# Patient Record
Sex: FEMALE | Race: WHITE | NOT HISPANIC OR LATINO | Employment: OTHER | URBAN - METROPOLITAN AREA
[De-identification: names, ages, dates, MRNs, and addresses within clinical notes are randomized per-mention and may not be internally consistent; named-entity substitution may affect disease eponyms.]

---

## 2023-06-26 ENCOUNTER — OFFICE VISIT (OUTPATIENT)
Dept: PRIMARY CARE | Facility: CLINIC | Age: 78
End: 2023-06-26
Payer: MEDICARE

## 2023-06-26 DIAGNOSIS — N94.10 DYSPAREUNIA IN FEMALE: Primary | ICD-10-CM

## 2023-06-26 PROCEDURE — UHSMG PR UH SELECT MEET AND GREET: Performed by: INTERNAL MEDICINE

## 2023-06-26 NOTE — PROGRESS NOTES
Patient presents today for meet and greet visit.  She is extraordinarily healthy.  She has mild hyperlipidemia and takes low-dose Crestor  Increased anxiety and does take Lexapro routinely feels it is working pretty well  She just had mammogram performed  She had fasting blood work performed which was in [place] in March  Really hospitalized for childbirth x2  Family history significant for  Mother  at age 50 but was from reaction from bipolar meds  Father had congestive heart failure but not into his 80s  No heart disease at young ages no breast ovarian or colon cancer  She has 1 sister who is healthy  Social history originally from [facility] moved to [place] for her 's job as an   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  Her diet is very healthy  She exercises routinely  She is not a smoker  She does drink alcohol socially and drinks more when she is in [place]    She is currently having some issues with painful intercourse

## 2023-08-07 DIAGNOSIS — Z00.00 ROUTINE HEALTH MAINTENANCE: ICD-10-CM

## 2023-08-08 ENCOUNTER — LAB (OUTPATIENT)
Dept: LAB | Facility: LAB | Age: 78
End: 2023-08-08
Payer: MEDICARE

## 2023-08-08 DIAGNOSIS — Z00.00 ROUTINE HEALTH MAINTENANCE: ICD-10-CM

## 2023-08-08 LAB
ALANINE AMINOTRANSFERASE (SGPT) (U/L) IN SER/PLAS: 12 U/L (ref 7–45)
ALBUMIN (G/DL) IN SER/PLAS: 4 G/DL (ref 3.4–5)
ALKALINE PHOSPHATASE (U/L) IN SER/PLAS: 58 U/L (ref 33–136)
ANION GAP IN SER/PLAS: 10 MMOL/L (ref 10–20)
ASPARTATE AMINOTRANSFERASE (SGOT) (U/L) IN SER/PLAS: 17 U/L (ref 9–39)
BASOPHILS (10*3/UL) IN BLOOD BY AUTOMATED COUNT: 0.03 X10E9/L (ref 0–0.1)
BASOPHILS/100 LEUKOCYTES IN BLOOD BY AUTOMATED COUNT: 0.7 % (ref 0–2)
BILIRUBIN TOTAL (MG/DL) IN SER/PLAS: 0.5 MG/DL (ref 0–1.2)
C REACTIVE PROTEIN (MG/L) IN SER/PLAS BY HIGH SENSIT: 0.3 MG/L
CALCIDIOL (25 OH VITAMIN D3) (NG/ML) IN SER/PLAS: 48 NG/ML
CALCIUM (MG/DL) IN SER/PLAS: 9.7 MG/DL (ref 8.6–10.3)
CARBON DIOXIDE, TOTAL (MMOL/L) IN SER/PLAS: 27 MMOL/L (ref 21–32)
CHLORIDE (MMOL/L) IN SER/PLAS: 107 MMOL/L (ref 98–107)
CHOLESTEROL (MG/DL) IN SER/PLAS: 211 MG/DL (ref 0–199)
CHOLESTEROL IN HDL (MG/DL) IN SER/PLAS: 75.2 MG/DL
CHOLESTEROL/HDL RATIO: 2.8
CREATININE (MG/DL) IN SER/PLAS: 0.85 MG/DL (ref 0.5–1.05)
EOSINOPHILS (10*3/UL) IN BLOOD BY AUTOMATED COUNT: 0.09 X10E9/L (ref 0–0.4)
EOSINOPHILS/100 LEUKOCYTES IN BLOOD BY AUTOMATED COUNT: 2.2 % (ref 0–6)
ERYTHROCYTE DISTRIBUTION WIDTH (RATIO) BY AUTOMATED COUNT: 12.1 % (ref 11.5–14.5)
ERYTHROCYTE MEAN CORPUSCULAR HEMOGLOBIN CONCENTRATION (G/DL) BY AUTOMATED: 34 G/DL (ref 32–36)
ERYTHROCYTE MEAN CORPUSCULAR VOLUME (FL) BY AUTOMATED COUNT: 89 FL (ref 80–100)
ERYTHROCYTES (10*6/UL) IN BLOOD BY AUTOMATED COUNT: 4.34 X10E12/L (ref 4–5.2)
ESTIMATED AVERAGE GLUCOSE FOR HBA1C: 105 MG/DL
GFR FEMALE: 71 ML/MIN/1.73M2
GLUCOSE (MG/DL) IN SER/PLAS: 95 MG/DL (ref 74–99)
HEMATOCRIT (%) IN BLOOD BY AUTOMATED COUNT: 38.8 % (ref 36–46)
HEMOGLOBIN (G/DL) IN BLOOD: 13.2 G/DL (ref 12–16)
HEMOGLOBIN A1C/HEMOGLOBIN TOTAL IN BLOOD: 5.3 %
IMMATURE GRANULOCYTES/100 LEUKOCYTES IN BLOOD BY AUTOMATED COUNT: 0.2 % (ref 0–0.9)
LDL: 124 MG/DL (ref 0–99)
LEUKOCYTES (10*3/UL) IN BLOOD BY AUTOMATED COUNT: 4 X10E9/L (ref 4.4–11.3)
LYMPHOCYTES (10*3/UL) IN BLOOD BY AUTOMATED COUNT: 1.46 X10E9/L (ref 0.8–3)
LYMPHOCYTES/100 LEUKOCYTES IN BLOOD BY AUTOMATED COUNT: 36.2 % (ref 13–44)
MONOCYTES (10*3/UL) IN BLOOD BY AUTOMATED COUNT: 0.37 X10E9/L (ref 0.05–0.8)
MONOCYTES/100 LEUKOCYTES IN BLOOD BY AUTOMATED COUNT: 9.2 % (ref 2–10)
MUCUS, URINE: NORMAL /LPF
NEUTROPHILS (10*3/UL) IN BLOOD BY AUTOMATED COUNT: 2.07 X10E9/L (ref 1.6–5.5)
NEUTROPHILS/100 LEUKOCYTES IN BLOOD BY AUTOMATED COUNT: 51.5 % (ref 40–80)
PLATELETS (10*3/UL) IN BLOOD AUTOMATED COUNT: 185 X10E9/L (ref 150–450)
POTASSIUM (MMOL/L) IN SER/PLAS: 4.2 MMOL/L (ref 3.5–5.3)
PROTEIN TOTAL: 6.2 G/DL (ref 6.4–8.2)
RBC, URINE: 1 /HPF (ref 0–5)
SODIUM (MMOL/L) IN SER/PLAS: 140 MMOL/L (ref 136–145)
SQUAMOUS EPITHELIAL CELLS, URINE: <1 /HPF
THYROTROPIN (MIU/L) IN SER/PLAS BY DETECTION LIMIT <= 0.05 MIU/L: 3.52 MIU/L (ref 0.44–3.98)
TRIGLYCERIDE (MG/DL) IN SER/PLAS: 60 MG/DL (ref 0–149)
UREA NITROGEN (MG/DL) IN SER/PLAS: 27 MG/DL (ref 6–23)
VLDL: 12 MG/DL (ref 0–40)
WBC, URINE: 3 /HPF (ref 0–5)

## 2023-08-08 PROCEDURE — 84443 ASSAY THYROID STIM HORMONE: CPT

## 2023-08-08 PROCEDURE — 83036 HEMOGLOBIN GLYCOSYLATED A1C: CPT

## 2023-08-08 PROCEDURE — 80053 COMPREHEN METABOLIC PANEL: CPT

## 2023-08-08 PROCEDURE — 80061 LIPID PANEL: CPT

## 2023-08-08 PROCEDURE — 36415 COLL VENOUS BLD VENIPUNCTURE: CPT

## 2023-08-08 PROCEDURE — 85025 COMPLETE CBC W/AUTO DIFF WBC: CPT

## 2023-08-08 PROCEDURE — 82306 VITAMIN D 25 HYDROXY: CPT

## 2023-08-08 PROCEDURE — 86141 C-REACTIVE PROTEIN HS: CPT

## 2023-08-08 PROCEDURE — 81001 URINALYSIS AUTO W/SCOPE: CPT

## 2023-08-09 NOTE — PROGRESS NOTES
Physical Exam    Name Bisi Doran    Date of Service :8/10/2023      Bisi Doran  78 y.o. is here for an annual physical exam  She is extraordinarily healthy with past medical history significant for  Mild hyperlipidemia and takes low-dose Crestor did have coronary artery calcium score in 2014 of 45  Anxiety takes Lexapro routinely which has worked well rare alprazolam  Osteopenia with T-score right around -2 last bone densitometry just performed July 2023 T-score -2.1 really very stable compared to previous study  Colonic polyps last colonoscopy was 2018 and did show 2 polyps which were tubular adenomas she is followed with Dr. Barron  Hospitalized for childbirth x 2  She has received both Prevnar 13 and Pneumovax 23 in the past    Past Medical History:   Diagnosis Date    Encounter for examination of ears and hearing without abnormal findings     Encounter for hearing test    Personal history of other endocrine, nutritional and metabolic disease 07/28/2015    History of hypercholesterolemia    Personal history of other mental and behavioral disorders     History of depression       Past Surgical History:   Procedure Laterality Date    APPENDECTOMY  08/20/2014    Appendectomy    TONSILLECTOMY  08/20/2014    Tonsillectomy        Social History     Tobacco Use    Smoking status: Never    Smokeless tobacco: Never        Social History     Social History Narrative    You are originally from [place]    Have lived in [place] for 53 years where you raised her family moved to [place] for your 's job as an     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        2 children both who live out of the state 1 in [place] and 1 in [place] and 3 grandchildren who are 1417 and 18    Diet is healthy    Routine exercise walk play golf do have a     You have never been a smoker    You do occasionally drink alcohol        Increased stress at  "this time as daughter who has multiple health issues and you are primary support system       Family History   Problem Relation Name Age of Onset    Bipolar disorder Mother      Heart failure Father  80    No Known Problems Sister          /78   Ht 1.651 m (5' 5\")   Wt 61.2 kg (135 lb)   BMI 22.47 kg/m²     Physical Exam  Physical examination  Reveals a well-developed woman in no acute distress    appearance is younger than stated age  HEENT exam  Extraocular motion is intact  Tympanic membranes and external auditory canals are normal  Oropharynx is normal  There is no cervical lymphadenopathy appreciated  The thyroid is within normal limits    Lungs    clear to auscultation and percussion    Cardiovascular   regular rate and rhythm  No murmurs rubs or gallops are appreciated    Breast examination   No dominant masses nipple discharge or axillary lymphadenopathy is appreciated    Abdomen   soft nontender bowel sounds are positive   there is no organomegaly noted        Periphery  Pulses are present without deficits noted  No peripheral edema is noted    Musculoskeletal  Gait is normal  Is no joint erythema or swelling noted  Range of motion is within normal limits  Strength is 5 of 5 without deficits noted    Dermatology  No concerning skin lesions are noted    Neurology  No deficits are noted  Judgment appears appropriate  Mood and affect are appropriate         Results from last 7 days   Lab Units 08/08/23  0809   WBC AUTO x10E9/L 4.0*   HEMOGLOBIN g/dL 13.2   HEMATOCRIT % 38.8   PLATELETS AUTO x10E9/L 185   NEUTROS PCT AUTO % 51.5   LYMPHS PCT AUTO % 36.2   MONOS PCT AUTO % 9.2   EOS PCT AUTO % 2.2      Results from last 7 days   Lab Units 08/08/23  0809   HEMOGLOBIN A1C % 5.3     Results from last 7 days   Lab Units 08/08/23  0809   SODIUM mmol/L 140   POTASSIUM mmol/L 4.2   CHLORIDE mmol/L 107   CO2 mmol/L 27   BUN mg/dL 27*   CREATININE mg/dL 0.85   CALCIUM mg/dL 9.7   PROTEIN TOTAL g/dL 6.2* " "  BILIRUBIN TOTAL mg/dL 0.5   ALK PHOS U/L 58   ALT U/L 12   AST U/L 17   GLUCOSE mg/dL 95      Results from last 7 days   Lab Units 08/08/23  0809   CHOLESTEROL mg/dL 211*   TRIGLYCERIDES mg/dL 60   HDL mg/dL 75.2   LDLF mg/dL 124*           Results from last 7 days   Lab Units 08/08/23  0809   TSH mIU/L 3.52           No lab exists for component: \"UAPPEAR\", \"UCOLOR\"  No components found for: \"UA\"  Lab on 08/08/2023   Component Date Value Ref Range Status    WBC 08/08/2023 4.0 (L)  4.4 - 11.3 x10E9/L Final    RBC 08/08/2023 4.34  4.00 - 5.20 x10E12/L Final    Hemoglobin 08/08/2023 13.2  12.0 - 16.0 g/dL Final    Hematocrit 08/08/2023 38.8  36.0 - 46.0 % Final    MCV 08/08/2023 89  80 - 100 fL Final    MCHC 08/08/2023 34.0  32.0 - 36.0 g/dL Final    Platelets 08/08/2023 185  150 - 450 x10E9/L Final    RDW 08/08/2023 12.1  11.5 - 14.5 % Final    Neutrophils % 08/08/2023 51.5  40.0 - 80.0 % Final    Immature Granulocytes %, Automated 08/08/2023 0.2  0.0 - 0.9 % Final     Immature Granulocyte Count (IG) includes promyelocytes,    myelocytes and metamyelocytes but does not include bands.   Percent differential counts (%) should be interpreted in the   context of the absolute cell counts (cells/L).    Lymphocytes % 08/08/2023 36.2  13.0 - 44.0 % Final    Monocytes % 08/08/2023 9.2  2.0 - 10.0 % Final    Eosinophils % 08/08/2023 2.2  0.0 - 6.0 % Final    Basophils % 08/08/2023 0.7  0.0 - 2.0 % Final    Neutrophils Absolute 08/08/2023 2.07  1.60 - 5.50 x10E9/L Final    Lymphocytes Absolute 08/08/2023 1.46  0.80 - 3.00 x10E9/L Final    Monocytes Absolute 08/08/2023 0.37  0.05 - 0.80 x10E9/L Final    Eosinophils Absolute 08/08/2023 0.09  0.00 - 0.40 x10E9/L Final    Basophils Absolute 08/08/2023 0.03  0.00 - 0.10 x10E9/L Final    Glucose 08/08/2023 95  74 - 99 mg/dL Final    Sodium 08/08/2023 140  136 - 145 mmol/L Final    Potassium 08/08/2023 4.2  3.5 - 5.3 mmol/L Final    Chloride 08/08/2023 107  98 - 107 mmol/L Final    " Bicarbonate 08/08/2023 27  21 - 32 mmol/L Final    Anion Gap 08/08/2023 10  10 - 20 mmol/L Final    Urea Nitrogen 08/08/2023 27 (H)  6 - 23 mg/dL Final    Creatinine 08/08/2023 0.85  0.50 - 1.05 mg/dL Final    GFR Female 08/08/2023 71  >90 mL/min/1.73m2 Final     CALCULATIONS OF ESTIMATED GFR ARE PERFORMED   USING THE 2021 CKD-EPI STUDY REFIT EQUATION   WITHOUT THE RACE VARIABLE FOR THE IDMS-TRACEABLE   CREATININE METHODS.    https://jasn.asnjournals.org/content/early/2021/09/22/ASN.7284054230    Calcium 08/08/2023 9.7  8.6 - 10.3 mg/dL Final    Albumin 08/08/2023 4.0  3.4 - 5.0 g/dL Final    Alkaline Phosphatase 08/08/2023 58  33 - 136 U/L Final    Total Protein 08/08/2023 6.2 (L)  6.4 - 8.2 g/dL Final    AST 08/08/2023 17  9 - 39 U/L Final    Total Bilirubin 08/08/2023 0.5  0.0 - 1.2 mg/dL Final    ALT (SGPT) 08/08/2023 12  7 - 45 U/L Final     Patients treated with Sulfasalazine may generate    falsely decreased results for ALT.    CRP, High Sensitivity 08/08/2023 0.3  mg/L Final        hsCRP         INTERPRETATION       mg/L  -------------------------------------------      < 1.0      LOW RELATIVE RISK OF CVD      1.0-3.0    AVERAGE RELATIVE RISK OF CVD      > 3.0      HIGH RELATIVE RISK OF CVD     Source:  MATOS, T. A. et al. CIRCULATION 2003;  107:499-511.    Hemoglobin A1C 08/08/2023 5.3  % Final         Diagnosis of Diabetes-Adults   Non-Diabetic: < or = 5.6%   Increased risk for developing diabetes: 5.7-6.4%   Diagnostic of diabetes: > or = 6.5%  .       Monitoring of Diabetes                Age (y)     Therapeutic Goal (%)   Adults:          >18           <7.0   Pediatrics:    13-18           <7.5                   7-12           <8.0                   0- 6            7.5-8.5   American Diabetes Association. Diabetes Care 33(S1), Jan 2010.    Estimated Average Glucose 08/08/2023 105  MG/DL Final    Cholesterol 08/08/2023 211 (H)  0 - 199 mg/dL Final    .      AGE      DESIRABLE   BORDERLINE HIGH   HIGH      0-19 Y     0 - 169       170 - 199     >/= 200    20-24 Y     0 - 189       190 - 224     >/= 225         >24 Y     0 - 199       200 - 239     >/= 240   **All ranges are based on fasting samples. Specific   therapeutic targets will vary based on patient-specific   cardiac risk.  .   Pediatric guidelines reference:Pediatrics 2011, 128(S5).   Adult guidelines reference: NCEP ATPIII Guidelines,     SUSANA 2001, 258:2486-97  .   Venipuncture immediately after or during the    administration of Metamizole may lead to falsely   low results. Testing should be performed immediately   prior to Metamizole dosing.    HDL 08/08/2023 75.2  mg/dL Final    .      AGE      VERY LOW   LOW     NORMAL    HIGH       0-19 Y       < 35   < 40     40-45     ----    20-24 Y       ----   < 40       >45     ----      >24 Y       ----   < 40     40-60      >60  .    Cholesterol/HDL Ratio 08/08/2023 2.8   Final    REF VALUES  DESIRABLE  < 3.4  HIGH RISK  > 5.0    LDL 08/08/2023 124 (H)  0 - 99 mg/dL Final    .                           NEAR      BORD      AGE      DESIRABLE  OPTIMAL    HIGH     HIGH     VERY HIGH     0-19 Y     0 - 109     ---    110-129   >/= 130     ----    20-24 Y     0 - 119     ---    120-159   >/= 160     ----      >24 Y     0 -  99   100-129  130-159   160-189     >/=190  .    VLDL 08/08/2023 12  0 - 40 mg/dL Final    Triglycerides 08/08/2023 60  0 - 149 mg/dL Final    .      AGE      DESIRABLE   BORDERLINE HIGH   HIGH     VERY HIGH   0 D-90 D    19 - 174         ----         ----        ----  91 D- 9 Y     0 -  74        75 -  99     >/= 100      ----    10-19 Y     0 -  89        90 - 129     >/= 130      ----    20-24 Y     0 - 114       115 - 149     >/= 150      ----         >24 Y     0 - 149       150 - 199    200- 499    >/= 500  .   Venipuncture immediately after or during the    administration of Metamizole may lead to falsely   low results. Testing should be performed immediately   prior to Metamizole dosing.     TSH 08/08/2023 3.52  0.44 - 3.98 mIU/L Final     TSH testing is performed using different testing    methodology at [facility] than at other    [facility] hospitals. Direct result comparisons should    only be made within the same method.    WBC, Urine 08/08/2023 3  0 - 5 /HPF Final    RBC, Urine 08/08/2023 1  0 - 5 /HPF Final    Squamous Epithelial Cells, Urine 08/08/2023 <1  /HPF Final    Mucus, Urine 08/08/2023 1+  /LPF Final    Vitamin D, 25-Hydroxy 08/08/2023 48  ng/mL Final    .  DEFICIENCY:         < 20   NG/ML  INSUFFICIENCY:      20-29  NG/ML  SUFFICIENCY:         NG/ML    THIS ASSAY ACCURATELY QUANTIFIES THE SUM OF  VITAMIN D3, 25-HYDROXY AND VIT D2,25-HYDROXY.       ECG: normal sinus rhythm, no blocks or conduction defects, no ischemic changes.   Nonspecific T wave noted    Problem List Items Addressed This Visit       Anxiety    Atherosclerotic heart disease    Hyperlipidemia    Osteopenia    Dyspareunia in female - Primary     Other Visit Diagnoses       Routine health maintenance        Relevant Orders    ECG 12 lead            Assessment/Plan   #1 hypercholesteremia cholesterol is slightly elevated and slightly higher than previous but he is currently taking low-dose Crestor at 5 mg 5 days weekly we are going to increase this to daily with a goal of getting LDL less than 100 plan to recheck cholesterol prior to returning to [place]  2.  Anxiety uses Lexapro routinely very rare alprazolam in fact use probably 1 or 2 in the past year we will refill medications as indicated do not feel controlled substance contract is indicated as she was this so rarely  3.  History of colonic polyp she does have colonoscopy scheduled in August  4.  Osteopenia which has been very stable over time continue adequate calcium vitamin D and weightbearing exercise and recheck bone density in 2 years  5.  Dyspareunia she does have upcoming appointment with specialist has been very bothersome over about the  last 2 years vaginal estrogen has not been helpful  6.  Health maintenance  Congratulated her on a very healthy lifestyle  Up-to-date with mammogram will be repeating colonoscopy soon  Basically up-to-date with immunizations I have recommended COVID-19 booster when she gets flu vaccine and will check on RSV vaccination as well

## 2023-08-10 ENCOUNTER — OFFICE VISIT (OUTPATIENT)
Dept: PRIMARY CARE | Facility: CLINIC | Age: 78
End: 2023-08-10
Payer: MEDICARE

## 2023-08-10 VITALS
WEIGHT: 135 LBS | DIASTOLIC BLOOD PRESSURE: 78 MMHG | BODY MASS INDEX: 22.49 KG/M2 | HEIGHT: 65 IN | SYSTOLIC BLOOD PRESSURE: 118 MMHG

## 2023-08-10 DIAGNOSIS — M85.80 OSTEOPENIA, UNSPECIFIED LOCATION: ICD-10-CM

## 2023-08-10 DIAGNOSIS — F41.9 ANXIETY: ICD-10-CM

## 2023-08-10 DIAGNOSIS — N94.10 DYSPAREUNIA IN FEMALE: Primary | ICD-10-CM

## 2023-08-10 DIAGNOSIS — I25.10 ATHEROSCLEROSIS OF NATIVE CORONARY ARTERY OF NATIVE HEART WITHOUT ANGINA PECTORIS: ICD-10-CM

## 2023-08-10 DIAGNOSIS — E78.00 PURE HYPERCHOLESTEROLEMIA: ICD-10-CM

## 2023-08-10 DIAGNOSIS — Z00.00 ROUTINE HEALTH MAINTENANCE: ICD-10-CM

## 2023-08-10 PROBLEM — E78.5 HYPERLIPIDEMIA: Status: ACTIVE | Noted: 2020-01-08

## 2023-08-10 PROBLEM — D36.9 ADENOMATOUS POLYPS: Status: ACTIVE | Noted: 2023-08-10

## 2023-08-10 PROCEDURE — 1036F TOBACCO NON-USER: CPT | Performed by: INTERNAL MEDICINE

## 2023-08-10 PROCEDURE — 1126F AMNT PAIN NOTED NONE PRSNT: CPT | Performed by: INTERNAL MEDICINE

## 2023-08-10 PROCEDURE — 93000 ELECTROCARDIOGRAM COMPLETE: CPT | Performed by: INTERNAL MEDICINE

## 2023-08-10 PROCEDURE — 1160F RVW MEDS BY RX/DR IN RCRD: CPT | Performed by: INTERNAL MEDICINE

## 2023-08-10 PROCEDURE — UHSPHYS PR UH SELECT PHYSICAL: Performed by: INTERNAL MEDICINE

## 2023-08-10 PROCEDURE — 1159F MED LIST DOCD IN RCRD: CPT | Performed by: INTERNAL MEDICINE

## 2023-08-10 RX ORDER — ALPRAZOLAM 0.25 MG/1
1 TABLET ORAL DAILY PRN
COMMUNITY
Start: 2022-03-11 | End: 2024-01-17 | Stop reason: SDUPTHER

## 2023-08-10 RX ORDER — ACETAMINOPHEN 500 MG
2000 TABLET ORAL
COMMUNITY

## 2023-08-10 RX ORDER — ROSUVASTATIN CALCIUM 5 MG/1
5 TABLET, COATED ORAL NIGHTLY
COMMUNITY
End: 2024-03-17 | Stop reason: SDUPTHER

## 2023-08-10 RX ORDER — ESCITALOPRAM OXALATE 10 MG/1
10 TABLET ORAL DAILY
COMMUNITY
End: 2023-09-05 | Stop reason: SDUPTHER

## 2023-08-10 ASSESSMENT — PAIN SCALES - GENERAL: PAINLEVEL: 0-NO PAIN

## 2023-08-10 NOTE — PATIENT INSTRUCTIONS
It was good to see you.  You are doing a good job with your overall health care.   Cholesterol is slightly elevated and slightly higher than previous values I would like you to start taking the Crestor every day.  You are up-to-date with immunizations except I do recommend a Covid 19 booster vaccination this fall when you get your flu vaccine on need for RSV vaccine as well  Continued routine regular exercise is recommended. American Cardiology Association recommended 100-120 mins weekly of aerobic exercise (exercise that increases your heart rate). In addition you should add resistance training (lifting weights/etc.).   Stretching and flexibility training and is also encouraged  Follow-up with colonoscopy as scheduled  Your bone density study remains stable osteopenia .  Adequate calcium vitamin D and weightbearing exercise is encouraged we will plan to recheck this in 2 years  Your mammogram was normal            I encourage you to stay active and healthy, and to follow these healthy habits:     Try to increase your intake of fish such as salmon and tuna and try to get 2 to 3 servings of fish per week.  Increase your intake of plant-based protein listed here:    Unprocessed nuts, walnuts, or almonds, Nuts and Seeds.      Green vegetables such as Broccoli, Peas, Greens, Spinach    Beans, Chickpeas, & Lentils    Other sources include Potatoes, Quinoa, Seaweed, Soymilk, Tempeh, and Tofu.  Increase food s higher in flavonoids found in black tea, green tea, apples, nuts, citrus fruit, berries, and dark chocolate.  You should avoid fried foods, and sugary or starchy foods and sugary drinks, and void saturated fats.    Try not to dine at restaurants frequently  and avoid fast food restaurants.      Try to get restful sleep approximately 7-9 hours every night.  Work towards getting 30 minutes of  moderate intensity exercise 4 to 5 days per week.  You should also try to exercise at least one hour per day with light  walking.

## 2023-08-21 ENCOUNTER — APPOINTMENT (OUTPATIENT)
Dept: PRIMARY CARE | Facility: CLINIC | Age: 78
End: 2023-08-21
Payer: MEDICARE

## 2023-08-28 DIAGNOSIS — N34.2 INFECTIVE URETHRITIS: Primary | ICD-10-CM

## 2023-08-28 RX ORDER — CIPROFLOXACIN 500 MG/1
500 TABLET ORAL 2 TIMES DAILY
Qty: 10 TABLET | Refills: 0 | Status: SHIPPED | OUTPATIENT
Start: 2023-08-28 | End: 2023-09-02

## 2023-08-30 ENCOUNTER — HOSPITAL ENCOUNTER (OUTPATIENT)
Dept: DATA CONVERSION | Facility: HOSPITAL | Age: 78
End: 2023-08-30
Attending: INTERNAL MEDICINE | Admitting: INTERNAL MEDICINE
Payer: MEDICARE

## 2023-08-30 DIAGNOSIS — Z86.010 PERSONAL HISTORY OF COLONIC POLYPS: ICD-10-CM

## 2023-08-30 DIAGNOSIS — Z12.11 ENCOUNTER FOR SCREENING FOR MALIGNANT NEOPLASM OF COLON: ICD-10-CM

## 2023-08-30 DIAGNOSIS — D36.9 BENIGN NEOPLASM, UNSPECIFIED SITE: ICD-10-CM

## 2023-09-05 DIAGNOSIS — F41.9 ANXIETY: Primary | ICD-10-CM

## 2023-09-05 RX ORDER — ESCITALOPRAM OXALATE 10 MG/1
20 TABLET ORAL DAILY
Qty: 180 TABLET | Refills: 3 | Status: SHIPPED | OUTPATIENT
Start: 2023-09-05

## 2023-10-09 ENCOUNTER — LAB (OUTPATIENT)
Dept: LAB | Facility: LAB | Age: 78
End: 2023-10-09
Payer: MEDICARE

## 2023-10-09 DIAGNOSIS — E78.00 PURE HYPERCHOLESTEROLEMIA: ICD-10-CM

## 2023-10-09 LAB
CHOLEST SERPL-MCNC: 195 MG/DL (ref 0–199)
CHOLESTEROL/HDL RATIO: 2.6
HDLC SERPL-MCNC: 75.9 MG/DL
LDLC SERPL CALC-MCNC: 108 MG/DL (ref 140–190)
NON HDL CHOLESTEROL: 119 MG/DL (ref 0–149)
TRIGL SERPL-MCNC: 56 MG/DL (ref 0–149)
VLDL: 11 MG/DL (ref 0–40)

## 2023-10-09 PROCEDURE — 36415 COLL VENOUS BLD VENIPUNCTURE: CPT

## 2023-10-09 PROCEDURE — 80061 LIPID PANEL: CPT

## 2024-01-17 DIAGNOSIS — F41.9 ANXIETY: Primary | ICD-10-CM

## 2024-01-17 RX ORDER — ALPRAZOLAM 0.25 MG/1
0.25 TABLET ORAL DAILY PRN
Qty: 7 TABLET | Refills: 0 | Status: SHIPPED | OUTPATIENT
Start: 2024-01-17

## 2024-03-05 ENCOUNTER — TELEPHONE (OUTPATIENT)
Dept: PRIMARY CARE | Facility: CLINIC | Age: 79
End: 2024-03-05
Payer: MEDICARE

## 2024-03-05 DIAGNOSIS — Z12.31 SCREENING MAMMOGRAM FOR BREAST CANCER: Primary | ICD-10-CM

## 2024-03-17 DIAGNOSIS — E78.00 PURE HYPERCHOLESTEROLEMIA: Primary | ICD-10-CM

## 2024-03-17 RX ORDER — ROSUVASTATIN CALCIUM 5 MG/1
5 TABLET, COATED ORAL NIGHTLY
Qty: 90 TABLET | Refills: 0 | Status: SHIPPED | OUTPATIENT
Start: 2024-03-17

## 2024-06-10 ENCOUNTER — HOSPITAL ENCOUNTER (OUTPATIENT)
Dept: RADIOLOGY | Facility: CLINIC | Age: 79
Discharge: HOME | End: 2024-06-10
Payer: MEDICARE

## 2024-06-10 VITALS — BODY MASS INDEX: 22.49 KG/M2 | WEIGHT: 135 LBS | HEIGHT: 65 IN

## 2024-06-10 DIAGNOSIS — Z12.31 SCREENING MAMMOGRAM FOR BREAST CANCER: ICD-10-CM

## 2024-06-10 PROCEDURE — 77063 BREAST TOMOSYNTHESIS BI: CPT | Performed by: STUDENT IN AN ORGANIZED HEALTH CARE EDUCATION/TRAINING PROGRAM

## 2024-06-10 PROCEDURE — 77063 BREAST TOMOSYNTHESIS BI: CPT

## 2024-06-10 PROCEDURE — 77067 SCR MAMMO BI INCL CAD: CPT | Performed by: STUDENT IN AN ORGANIZED HEALTH CARE EDUCATION/TRAINING PROGRAM

## 2024-06-27 DIAGNOSIS — Z00.00 PHYSICAL EXAM: ICD-10-CM

## 2024-07-08 ENCOUNTER — LAB (OUTPATIENT)
Dept: LAB | Facility: LAB | Age: 79
End: 2024-07-08
Payer: MEDICARE

## 2024-07-08 DIAGNOSIS — Z00.00 PHYSICAL EXAM: ICD-10-CM

## 2024-07-08 LAB
25(OH)D3 SERPL-MCNC: 44 NG/ML (ref 30–100)
ALBUMIN SERPL BCP-MCNC: 3.9 G/DL (ref 3.4–5)
ALP SERPL-CCNC: 64 U/L (ref 33–136)
ALT SERPL W P-5'-P-CCNC: 12 U/L (ref 7–45)
ANION GAP SERPL CALC-SCNC: 9 MMOL/L (ref 10–20)
APPEARANCE UR: CLEAR
AST SERPL W P-5'-P-CCNC: 19 U/L (ref 9–39)
BASOPHILS # BLD AUTO: 0.02 X10*3/UL (ref 0–0.1)
BASOPHILS NFR BLD AUTO: 0.5 %
BILIRUB SERPL-MCNC: 0.5 MG/DL (ref 0–1.2)
BILIRUB UR STRIP.AUTO-MCNC: NEGATIVE MG/DL
BUN SERPL-MCNC: 30 MG/DL (ref 6–23)
CALCIUM SERPL-MCNC: 9.3 MG/DL (ref 8.6–10.3)
CHLORIDE SERPL-SCNC: 106 MMOL/L (ref 98–107)
CHOLEST SERPL-MCNC: 196 MG/DL (ref 0–199)
CHOLESTEROL/HDL RATIO: 2.5
CO2 SERPL-SCNC: 30 MMOL/L (ref 21–32)
COLOR UR: ABNORMAL
CREAT SERPL-MCNC: 0.93 MG/DL (ref 0.5–1.05)
CRP SERPL-MCNC: <0.1 MG/DL
EGFRCR SERPLBLD CKD-EPI 2021: 64 ML/MIN/1.73M*2
EOSINOPHIL # BLD AUTO: 0.08 X10*3/UL (ref 0–0.4)
EOSINOPHIL NFR BLD AUTO: 2.2 %
ERYTHROCYTE [DISTWIDTH] IN BLOOD BY AUTOMATED COUNT: 12.2 % (ref 11.5–14.5)
EST. AVERAGE GLUCOSE BLD GHB EST-MCNC: 105 MG/DL
GLUCOSE SERPL-MCNC: 81 MG/DL (ref 74–99)
GLUCOSE UR STRIP.AUTO-MCNC: NORMAL MG/DL
HBA1C MFR BLD: 5.3 %
HCT VFR BLD AUTO: 38.4 % (ref 36–46)
HDLC SERPL-MCNC: 79.9 MG/DL
HGB BLD-MCNC: 13.1 G/DL (ref 12–16)
IMM GRANULOCYTES # BLD AUTO: 0.02 X10*3/UL (ref 0–0.5)
IMM GRANULOCYTES NFR BLD AUTO: 0.5 % (ref 0–0.9)
KETONES UR STRIP.AUTO-MCNC: NEGATIVE MG/DL
LDLC SERPL CALC-MCNC: 104 MG/DL
LEUKOCYTE ESTERASE UR QL STRIP.AUTO: ABNORMAL
LYMPHOCYTES # BLD AUTO: 1.45 X10*3/UL (ref 0.8–3)
LYMPHOCYTES NFR BLD AUTO: 39.3 %
MCH RBC QN AUTO: 30.8 PG (ref 26–34)
MCHC RBC AUTO-ENTMCNC: 34.1 G/DL (ref 32–36)
MCV RBC AUTO: 90 FL (ref 80–100)
MONOCYTES # BLD AUTO: 0.3 X10*3/UL (ref 0.05–0.8)
MONOCYTES NFR BLD AUTO: 8.1 %
MUCOUS THREADS #/AREA URNS AUTO: ABNORMAL /LPF
NEUTROPHILS # BLD AUTO: 1.82 X10*3/UL (ref 1.6–5.5)
NEUTROPHILS NFR BLD AUTO: 49.4 %
NITRITE UR QL STRIP.AUTO: NEGATIVE
NON HDL CHOLESTEROL: 116 MG/DL (ref 0–149)
NRBC BLD-RTO: 0 /100 WBCS (ref 0–0)
PH UR STRIP.AUTO: 6 [PH]
PLATELET # BLD AUTO: 178 X10*3/UL (ref 150–450)
POTASSIUM SERPL-SCNC: 4.2 MMOL/L (ref 3.5–5.3)
PROT SERPL-MCNC: 6.1 G/DL (ref 6.4–8.2)
PROT UR STRIP.AUTO-MCNC: NEGATIVE MG/DL
RBC # BLD AUTO: 4.26 X10*6/UL (ref 4–5.2)
RBC # UR STRIP.AUTO: ABNORMAL /UL
RBC #/AREA URNS AUTO: ABNORMAL /HPF
SODIUM SERPL-SCNC: 141 MMOL/L (ref 136–145)
SP GR UR STRIP.AUTO: 1.02
SQUAMOUS #/AREA URNS AUTO: ABNORMAL /HPF
TRIGL SERPL-MCNC: 62 MG/DL (ref 0–149)
TSH SERPL-ACNC: 3.69 MIU/L (ref 0.44–3.98)
UROBILINOGEN UR STRIP.AUTO-MCNC: NORMAL MG/DL
VLDL: 12 MG/DL (ref 0–40)
WBC # BLD AUTO: 3.7 X10*3/UL (ref 4.4–11.3)
WBC #/AREA URNS AUTO: ABNORMAL /HPF

## 2024-07-08 PROCEDURE — 80053 COMPREHEN METABOLIC PANEL: CPT

## 2024-07-08 PROCEDURE — 84443 ASSAY THYROID STIM HORMONE: CPT

## 2024-07-08 PROCEDURE — 82306 VITAMIN D 25 HYDROXY: CPT

## 2024-07-08 PROCEDURE — 81001 URINALYSIS AUTO W/SCOPE: CPT

## 2024-07-08 PROCEDURE — 80061 LIPID PANEL: CPT

## 2024-07-08 PROCEDURE — 83036 HEMOGLOBIN GLYCOSYLATED A1C: CPT

## 2024-07-08 PROCEDURE — 86140 C-REACTIVE PROTEIN: CPT

## 2024-07-08 PROCEDURE — 85025 COMPLETE CBC W/AUTO DIFF WBC: CPT

## 2024-07-08 PROCEDURE — 36415 COLL VENOUS BLD VENIPUNCTURE: CPT

## 2024-07-12 NOTE — PROGRESS NOTES
Physical Exam    Name Bisi Doran    Date of Service :7/15/2024      Bisi Doran  78 y.o. is here for an annual physical exam  She is extraordinarily healthy with past medical history significant for  Mild hyperlipidemia and takes low-dose Crestor did have coronary artery calcium score in 2014 of 45  Anxiety takes Lexapro routinely which has worked well rare alprazolam    Her anxiety is more situational with her daughter who has multiple mental health issues and some physical issues as well she did get a prescription for alprazolam in December and has only used 1 we did discuss alprazolam that is more for very acute situation she feels she might do better with something a little longer lasting  Osteopenia with T-score right around -2 last bone densitometry just performed July 2023 T-score -2.1 really very stable compared to previous study  Colonic polyps last colonoscopy 2023 clean so does not require further colonoscopy  Hospitalized for childbirth x 2  She has received both Prevnar 13 and Pneumovax 23 in the past  Just completed mammogram in June    Overall she is feeling well her biggest concern is her increased stress and anxiety    Past Medical History:   Diagnosis Date    Encounter for examination of ears and hearing without abnormal findings     Encounter for hearing test    Personal history of other endocrine, nutritional and metabolic disease 07/28/2015    History of hypercholesterolemia    Personal history of other mental and behavioral disorders     History of depression       Past Surgical History:   Procedure Laterality Date    APPENDECTOMY  08/20/2014    Appendectomy    TONSILLECTOMY  08/20/2014    Tonsillectomy        Social History     Tobacco Use    Smoking status: Never    Smokeless tobacco: Never   Vaping Use    Vaping status: Never Used        Social History     Social History Narrative    You are originally from [place]    Have lived in [place] for 53 years where you raised her family  "moved to [place] for your 's job as an     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      55 years     2 children both who live out of the state 1 in [place] and 1 in [place] and 3 grandchildren who are 15 178and 18    Diet is healthy    Routine exercise walk play golf do have a     You have never been a smoker    You do occasionally drink alcohol        Increased stress at this time as daughter who has multiple health issues and you are primary support system       Family History   Problem Relation Name Age of Onset    Bipolar disorder Mother           50 allergic reaction    Heart failure Father  80         87    No Known Problems Sister          There were no vitals taken for this visit.    Physical Exam  Physical examination  Reveals a well-developed woman in no acute distress    appearance is younger than stated age  HEENT exam  Extraocular motion is intact  Tympanic membranes and external auditory canals are normal  Oropharynx is normal  There is no cervical lymphadenopathy appreciated  The thyroid is within normal limits    Lungs    clear to auscultation and percussion    Cardiovascular   regular rate and rhythm  No murmurs rubs or gallops are appreciated    Breast examination   No dominant masses nipple discharge or axillary lymphadenopathy is appreciated    Abdomen   soft nontender bowel sounds are positive   there is no organomegaly noted      Periphery  Pulses are present without deficits noted  No peripheral edema is noted    Musculoskeletal  Gait is normal  Is no joint erythema or swelling noted  Range of motion is within normal limits  Strength is 5 of 5 without deficits noted    Dermatology  No concerning skin lesions are noted    Neurology  No deficits are noted  Judgment appears appropriate  Mood and affect are appropriate                          No lab exists for component: \"LABALBU\"   " "                            No lab exists for component: \"UAPPEAR\", \"UCOLOR\"    No components found for: \"UA\"  Lab on 07/08/2024   Component Date Value Ref Range Status    Cholesterol 07/08/2024 196  0 - 199 mg/dL Final          Age      Desirable   Borderline High   High     0-19 Y     0 - 169       170 - 199     >/= 200    20-24 Y     0 - 189       190 - 224     >/= 225         >24 Y     0 - 199       200 - 239     >/= 240   **All ranges are based on fasting samples. Specific   therapeutic targets will vary based on patient-specific   cardiac risk.    Pediatric guidelines reference:Pediatrics 2011, 128(S5).Adult guidelines reference: NCEP ATPIII Guidelines,SUSANA 2001, 258:9686-74    Venipuncture immediately after or during the administration of Metamizole may lead to falsely low results. Testing should be performed immediately prior to Metamizole dosing.    HDL-Cholesterol 07/08/2024 79.9  mg/dL Final      Age       Very Low   Low     Normal    High    0-19 Y    < 35      < 40     40-45     ----  20-24 Y    ----     < 40      >45      ----        >24 Y      ----     < 40     40-60      >60      Cholesterol/HDL Ratio 07/08/2024 2.5   Final      Ref Values  Desirable  < 3.4  High Risk  > 5.0    LDL Calculated 07/08/2024 104 (H)  <=99 mg/dL Final                                Near   Borderline      AGE      Desirable  Optimal    High     High     Very High     0-19 Y     0 - 109     ---    110-129   >/= 130     ----    20-24 Y     0 - 119     ---    120-159   >/= 160     ----      >24 Y     0 -  99   100-129  130-159   160-189     >/=190      VLDL 07/08/2024 12  0 - 40 mg/dL Final    Triglycerides 07/08/2024 62  0 - 149 mg/dL Final       Age         Desirable   Borderline High   High     Very High   0 D-90 D    19 - 174         ----         ----        ----  91 D- 9 Y     0 -  74        75 -  99     >/= 100      ----    10-19 Y     0 -  89        90 - 129     >/= 130      ----    20-24 Y     0 - 114       115 - 149   "   >/= 150      ----         >24 Y     0 - 149       150 - 199    200- 499    >/= 500    Venipuncture immediately after or during the administration of Metamizole may lead to falsely low results. Testing should be performed immediately prior to Metamizole dosing.    Non HDL Cholesterol 07/08/2024 116  0 - 149 mg/dL Final          Age       Desirable   Borderline High   High     Very High     0-19 Y     0 - 119       120 - 144     >/= 145    >/= 160    20-24 Y     0 - 149       150 - 189     >/= 190      ----         >24 Y    30 mg/dL above LDL Cholesterol goal      Glucose 07/08/2024 81  74 - 99 mg/dL Final    Sodium 07/08/2024 141  136 - 145 mmol/L Final    Potassium 07/08/2024 4.2  3.5 - 5.3 mmol/L Final    Chloride 07/08/2024 106  98 - 107 mmol/L Final    Bicarbonate 07/08/2024 30  21 - 32 mmol/L Final    Anion Gap 07/08/2024 9 (L)  10 - 20 mmol/L Final    Urea Nitrogen 07/08/2024 30 (H)  6 - 23 mg/dL Final    Creatinine 07/08/2024 0.93  0.50 - 1.05 mg/dL Final    eGFR 07/08/2024 64  >60 mL/min/1.73m*2 Final    Calculations of estimated GFR are performed using the 2021 CKD-EPI Study Refit equation without the race variable for the IDMS-Traceable creatinine methods.  https://jasn.asnjournals.org/content/early/2021/09/22/ASN.1938694636    Calcium 07/08/2024 9.3  8.6 - 10.3 mg/dL Final    Albumin 07/08/2024 3.9  3.4 - 5.0 g/dL Final    Alkaline Phosphatase 07/08/2024 64  33 - 136 U/L Final    Total Protein 07/08/2024 6.1 (L)  6.4 - 8.2 g/dL Final    AST 07/08/2024 19  9 - 39 U/L Final    Bilirubin, Total 07/08/2024 0.5  0.0 - 1.2 mg/dL Final    ALT 07/08/2024 12  7 - 45 U/L Final    Patients treated with Sulfasalazine may generate falsely decreased results for ALT.    Thyroid Stimulating Hormone 07/08/2024 3.69  0.44 - 3.98 mIU/L Final    Vitamin D, 25-Hydroxy, Total 07/08/2024 44  30 - 100 ng/mL Final    C-Reactive Protein 07/08/2024 <0.10  <1.00 mg/dL Final    Hemoglobin A1C 07/08/2024 5.3  see below % Final     Estimated Average Glucose 07/08/2024 105  Not Established mg/dL Final    WBC 07/08/2024 3.7 (L)  4.4 - 11.3 x10*3/uL Final    nRBC 07/08/2024 0.0  0.0 - 0.0 /100 WBCs Final    RBC 07/08/2024 4.26  4.00 - 5.20 x10*6/uL Final    Hemoglobin 07/08/2024 13.1  12.0 - 16.0 g/dL Final    Hematocrit 07/08/2024 38.4  36.0 - 46.0 % Final    MCV 07/08/2024 90  80 - 100 fL Final    MCH 07/08/2024 30.8  26.0 - 34.0 pg Final    MCHC 07/08/2024 34.1  32.0 - 36.0 g/dL Final    RDW 07/08/2024 12.2  11.5 - 14.5 % Final    Platelets 07/08/2024 178  150 - 450 x10*3/uL Final    Neutrophils % 07/08/2024 49.4  40.0 - 80.0 % Final    Immature Granulocytes %, Automated 07/08/2024 0.5  0.0 - 0.9 % Final    Immature Granulocyte Count (IG) includes promyelocytes, myelocytes and metamyelocytes but does not include bands. Percent differential counts (%) should be interpreted in the context of the absolute cell counts (cells/UL).    Lymphocytes % 07/08/2024 39.3  13.0 - 44.0 % Final    Monocytes % 07/08/2024 8.1  2.0 - 10.0 % Final    Eosinophils % 07/08/2024 2.2  0.0 - 6.0 % Final    Basophils % 07/08/2024 0.5  0.0 - 2.0 % Final    Neutrophils Absolute 07/08/2024 1.82  1.60 - 5.50 x10*3/uL Final    Percent differential counts (%) should be interpreted in the context of the absolute cell counts (cells/uL).    Immature Granulocytes Absolute, Au* 07/08/2024 0.02  0.00 - 0.50 x10*3/uL Final    Lymphocytes Absolute 07/08/2024 1.45  0.80 - 3.00 x10*3/uL Final    Monocytes Absolute 07/08/2024 0.30  0.05 - 0.80 x10*3/uL Final    Eosinophils Absolute 07/08/2024 0.08  0.00 - 0.40 x10*3/uL Final    Basophils Absolute 07/08/2024 0.02  0.00 - 0.10 x10*3/uL Final    Color, Urine 07/08/2024 Light-Yellow  Light-Yellow, Yellow, Dark-Yellow Final    Appearance, Urine 07/08/2024 Clear  Clear Final    Specific Gravity, Urine 07/08/2024 1.020  1.005 - 1.035 Final    pH, Urine 07/08/2024 6.0  5.0, 5.5, 6.0, 6.5, 7.0, 7.5, 8.0 Final    Protein, Urine 07/08/2024  NEGATIVE  NEGATIVE, 10 (TRACE), 20 (TRACE) mg/dL Final    Glucose, Urine 07/08/2024 Normal  Normal mg/dL Final    Blood, Urine 07/08/2024 0.06 (1+) (A)  NEGATIVE Final    Ketones, Urine 07/08/2024 NEGATIVE  NEGATIVE mg/dL Final    Bilirubin, Urine 07/08/2024 NEGATIVE  NEGATIVE Final    Urobilinogen, Urine 07/08/2024 Normal  Normal mg/dL Final    Nitrite, Urine 07/08/2024 NEGATIVE  NEGATIVE Final    Leukocyte Esterase, Urine 07/08/2024 250 Amna/µL (A)  NEGATIVE Final    WBC, Urine 07/08/2024 6-10 (A)  1-5, NONE /HPF Final    RBC, Urine 07/08/2024 3-5  NONE, 1-2, 3-5 /HPF Final    Squamous Epithelial Cells, Urine 07/08/2024 1-9 (SPARSE)  Reference range not established. /HPF Final    Mucus, Urine 07/08/2024 FEW  Reference range not established. /LPF Final     [unfilled]   No results found.   The 10-year ASCVD risk score (Jag DK, et al., 2019) is: 14.7%    Values used to calculate the score:      Age: 78 years      Sex: Female      Is Non- : No      Diabetic: No      Tobacco smoker: No      Systolic Blood Pressure: 114 mmHg      Is BP treated: No      HDL Cholesterol: 79.9 mg/dL      Total Cholesterol: 196 mg/dL   .      Problem List Items Addressed This Visit       Anxiety - Primary    Relevant Medications    clonazePAM (KlonoPIN) 0.5 mg tablet       Assessment/Plan   #1  Hypercholesteremia cholesterol is under adequate control good control continue current regimen which is low-dose Crestor  2.  Anxiety mostly situational she is on Lexapro usually only takes the 1 daily as we had discussed increasing to 20 mg daily she rarely uses alprazolam for discussed perhaps a trial of clonazepam if needed but still would really like to limit its use as we discussed this too is addictive but less so than the alprazolam we will try this and she will report back  3.  Health maintenance  Congratulated her on a very healthy lifestyle  Does not require further colonoscopy  Reviewed all blood work which is  acceptable  Continue with yearly mammogram  I do recommend COVID-19 booster RSV and flu vaccine in the fall  Otherwise she is up-to-date with immunization  4.  Osteopenia bone density has been quite stable last bone density July 2023 repeat July 2025 continue adequate calcium vitamin D in diet

## 2024-07-15 ENCOUNTER — APPOINTMENT (OUTPATIENT)
Dept: PRIMARY CARE | Facility: CLINIC | Age: 79
End: 2024-07-15
Payer: MEDICARE

## 2024-07-15 ENCOUNTER — OFFICE VISIT (OUTPATIENT)
Dept: PRIMARY CARE | Facility: CLINIC | Age: 79
End: 2024-07-15
Payer: MEDICARE

## 2024-07-15 VITALS
HEIGHT: 65 IN | BODY MASS INDEX: 22.66 KG/M2 | DIASTOLIC BLOOD PRESSURE: 78 MMHG | WEIGHT: 136 LBS | SYSTOLIC BLOOD PRESSURE: 114 MMHG

## 2024-07-15 DIAGNOSIS — F41.9 ANXIETY: ICD-10-CM

## 2024-07-15 DIAGNOSIS — M85.80 OSTEOPENIA, UNSPECIFIED LOCATION: ICD-10-CM

## 2024-07-15 DIAGNOSIS — F41.9 ANXIETY: Primary | ICD-10-CM

## 2024-07-15 DIAGNOSIS — E78.00 PURE HYPERCHOLESTEROLEMIA: Primary | ICD-10-CM

## 2024-07-15 PROCEDURE — G0439 PPPS, SUBSEQ VISIT: HCPCS | Performed by: INTERNAL MEDICINE

## 2024-07-15 PROCEDURE — 1159F MED LIST DOCD IN RCRD: CPT | Performed by: INTERNAL MEDICINE

## 2024-07-15 PROCEDURE — 1036F TOBACCO NON-USER: CPT | Performed by: INTERNAL MEDICINE

## 2024-07-15 PROCEDURE — UHSPHYS PR UH SELECT PHYSICAL: Performed by: INTERNAL MEDICINE

## 2024-07-15 PROCEDURE — 1160F RVW MEDS BY RX/DR IN RCRD: CPT | Performed by: INTERNAL MEDICINE

## 2024-07-15 RX ORDER — CLONAZEPAM 0.5 MG/1
0.5 TABLET ORAL 2 TIMES DAILY
Qty: 15 TABLET | Refills: 0 | Status: SHIPPED | OUTPATIENT
Start: 2024-07-15

## 2024-07-15 ASSESSMENT — ENCOUNTER SYMPTOMS
OCCASIONAL FEELINGS OF UNSTEADINESS: 0
LOSS OF SENSATION IN FEET: 0
DEPRESSION: 0

## 2024-07-15 ASSESSMENT — PROMIS GLOBAL HEALTH SCALE
RATE_MENTAL_HEALTH: GOOD
RATE_PHYSICAL_HEALTH: EXCELLENT
CARRYOUT_PHYSICAL_ACTIVITIES: COMPLETELY
RATE_GENERAL_HEALTH: VERY GOOD
RATE_AVERAGE_PAIN: 0
RATE_QUALITY_OF_LIFE: VERY GOOD
CARRYOUT_SOCIAL_ACTIVITIES: EXCELLENT
EMOTIONAL_PROBLEMS: OFTEN
RATE_SOCIAL_SATISFACTION: EXCELLENT

## 2024-07-15 ASSESSMENT — ACTIVITIES OF DAILY LIVING (ADL)
DOING_HOUSEWORK: INDEPENDENT
TAKING_MEDICATION: INDEPENDENT
MANAGING_FINANCES: INDEPENDENT
GROCERY_SHOPPING: INDEPENDENT

## 2024-07-15 NOTE — PATIENT INSTRUCTIONS
It was good to see you.  You are doing a good job with your overall health care.   Blood work is all acceptable  Continue routine regular exercise  As you are well aware your biggest issue is anxiety I know it is situational.  Making sure you keep track of yourself is very important so I am glad you are going to counseling  Lets try the clonazepam to use as needed for the anxiety as we discussed it lasts a little longer and is less addictive than alprazolam so think it is worth a trial  I do recommend COVID vaccination RSV and flu vaccination in the fall  Continue with yearly mammograms  You will be due for bone density in about 1 year    I encourage you to stay active and healthy, and to follow these healthy habits:     Try to increase your intake of fish such as salmon and tuna and try to get 2 to 3 servings of fish per week.  Increase your intake of plant-based protein listed here:    Unprocessed nuts, walnuts, or almonds, Nuts and Seeds.      Green vegetables such as Broccoli, Peas, Greens, Spinach    Beans, Chickpeas, & Lentils    Other sources include Potatoes, Quinoa, Seaweed, Soymilk, Tempeh, and Tofu.  Increase food s higher in flavonoids found in black tea, green tea, apples, nuts, citrus fruit, berries, and dark chocolate.  You should avoid fried foods, and sugary or starchy foods and sugary drinks, and avoid saturated fats.    Try not to dine at restaurants frequently and avoid fast food restaurants.      Try to get restful sleep approximately 7-9 hours every night.  Work towards getting 30 minutes of  moderate intensity exercise 4 to 5 days per week.  You should also try to exercise at least one hour per day with light walking.

## 2024-07-15 NOTE — PROGRESS NOTES
Chief Complaint: Medicare Wellness Exam/Comprehensive Problem Focused Follow Up and Physical Exam    HPI:    She is extraordinarily healthy with past medical history significant for  Mild hyperlipidemia and takes low-dose Crestor did have coronary artery calcium score in 2014 of 45  Anxiety takes Lexapro routinely which has worked well rare alprazolam    Her anxiety is more situational with her daughter who has multiple mental health issues and some physical issues as well she did get a prescription for alprazolam in December and has only used 1 we did discuss alprazolam that is more for very acute situation she feels she might do better with something a little longer lasting  Osteopenia with T-score right around -2 last bone densitometry just performed July 2023 T-score -2.1 really very stable compared to previous study  Colonic polyps last colonoscopy 2023 clean so does not require further colonoscopy  Hospitalized for childbirth x 2  She has received both Prevnar 13 and Pneumovax 23 in the past  Just completed mammogram in June     Overall she is feeling well her biggest concern is her increased stress and anxiety  Active Problem List      Comprehensive Medical/Surgical/Social/Family History  Past Medical History:   Diagnosis Date    Encounter for examination of ears and hearing without abnormal findings     Encounter for hearing test    Personal history of other endocrine, nutritional and metabolic disease 07/28/2015    History of hypercholesterolemia    Personal history of other mental and behavioral disorders     History of depression     Past Surgical History:   Procedure Laterality Date    APPENDECTOMY  08/20/2014    Appendectomy    TONSILLECTOMY  08/20/2014    Tonsillectomy     Social History     Social History Narrative    You are originally from [place]    Have lived in [place] for 53 years where you raised her family moved to [place] for your 's job as an     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      55 years     2 children both who live out of the state 1 in [place] and 1 in [place] and 3 grandchildren who are 15 178and 18    Diet is healthy    Routine exercise walk play golf do have a     You have never been a smoker    You do occasionally drink alcohol        Increased stress at this time as daughter who has multiple health issues and you are primary support system         Allergies and Medications  Penicillins  Current Outpatient Medications on File Prior to Visit   Medication Sig Dispense Refill    ALPRAZolam (Xanax) 0.25 mg tablet Take 1 tablet (0.25 mg) by mouth once daily as needed for anxiety. 7 tablet 0    calcium carbonate (CALCIUM 600 ORAL) Take by mouth 2 times a day.      cholecalciferol (Vitamin D-3) 50 mcg (2,000 unit) capsule Take 1 capsule (50 mcg) by mouth once daily.      clonazePAM (KlonoPIN) 0.5 mg tablet Take 1 tablet (0.5 mg) by mouth 2 times a day. 15 tablet 0    escitalopram (Lexapro) 10 mg tablet Take 2 tablets (20 mg) by mouth once daily. 180 tablet 3    rosuvastatin (Crestor) 5 mg tablet Take 1 tablet (5 mg) by mouth once daily at bedtime. 90 tablet 0     No current facility-administered medications on file prior to visit.       Medications and Supplements  prescribed by me and other practitioners or clinical pharmacist (such as prescriptions, OTC's, herbal therapies and supplements) were reviewed and documented in the medical record.    Tobacco/Alcohol/Opioid use, as well as Illicit Drug Use was screened for/reviewed and documented in Social History section and medication list as appropriate  Activities of Daily Living  In your present state of health, do you have any difficulty performing the following activities?:   Preparing food and eating?: No  Bathing yourself: No  Getting dressed: No  Using the toilet:No  Moving around from place to place: No  In the past year have  "you fallen or had a near fall?:No    Depression Screen  (Note: if answer to either of the following is \"Yes\", then a more complete depression screening is indicated)   Q1: Over the past two weeks, have you felt down, depressed or hopeless? No  Q2: Over the past two weeks, have you felt little interest or pleasure in doing things? No    Current exercise habits: Exercises routinely she plays golf frequently and does have a  as well  Dietary issues discussed: Yes  Hearing difficulties: Yes  Safe in current home environment: yes  Visual Acuity assessed: no  Cognitive Impairment assessed: no       Advance directives  Advanced Care Planning (including a Living Will, Healthcare POA, as well as specific end of life choices and/or directives), was discussed   Cardiac Risk Assessment  Cardiovascular risk was discussed and, if needed, lifestyle modifications recommended, including nutritional choices, exercise, and elimination of habits contributing to risk. We agreed on a plan to reduce the current cardiovascular risk based on above discussion as needed.  Aspirin use/disuse was discussed after reviewing the updated guidelines below:    Consider low dose Aspirin ( mg) use if the benefit for cardiovascular disease prevention outweighs risk for bleeding complications.   In general, low dose ASA should be considered:  In patients WITHOUT prior MI/stroke/PAD (primary prevention):   a. Age <60: Use if 10-year cardiovascular disease risk >20%, with discussion of risks and benefits with patient  b. Age 60-<70: Use if 10-year cardiovascular disease risk >20% and low bleeding (e.g., gastrointenstinal) risk, with discussion of risks and benefits with patient  c. Age >=70: Do not use    In patients WITH prior MI/stroke/PAD (secondary prevention):   Generally use unless extremely high bleeding (e.g., gastrointenstinal) risk, with discussion of risks and benefits with patient    ROS otherwise negative aside from what " "was mentioned above in HPI.    Vitals  /78   Ht 1.651 m (5' 5\")   Wt 61.7 kg (136 lb)   BMI 22.63 kg/m²   Body mass index is 22.63 kg/m².  Physical Exam  Gen: Alert, NAD  HEENT:  PERRLA, EOMI, conjunctiva and sclera normal in appearance. External auditory canals/TMs normal; Oral cavity and posterior pharynx without lesions/exudate  Neck:  Supple with FROM; No masses/nodes palpable; Thyroid nontender and without nodules; No PARTH  Respiratory:  Lungs CTAB  Cardiovascular:  Heart RRR. No M/R/G. Peripheral pulses equal bilaterally  Abdomen:  Soft, nontender, BS present throughout; No R/G/R; No HSM or masses palpated  Extremities:  FROM all extremities; Muscle strength grossly normal with good tone  Neuro:  CN II-XII intact; Reflexes 2+/2+; Gross motor and sensory intact  Skin:  No suspicious lesions present  Breast: No masses, skin lesions or nipple discharges, no axillary lymphadenopathy    Assessment and Plan:    #1  Hypercholesteremia cholesterol is under adequate control good control continue current regimen which is low-dose Crestor  2.  Anxiety mostly situational she is on Lexapro usually only takes the 1 daily as we had discussed increasing to 20 mg daily she rarely uses alprazolam for discussed perhaps a trial of clonazepam if needed but still would really like to limit its use as we discussed this too is addictive but less so than the alprazolam we will try this and she will report back  3.  Health maintenance  Congratulated her on a very healthy lifestyle  Does not require further colonoscopy  Reviewed all blood work which is acceptable  Continue with yearly mammogram  I do recommend COVID-19 booster RSV and flu vaccine in the fall  Otherwise she is up-to-date with immunization  4.  Osteopenia bone density has been quite stable last bone density July 2023 repeat July 2025 continue adequate calcium vitamin D in diet         During the course of the visit the patient was educated and counseled about age " appropriate screening and preventive services. Completed preventive screenings were documented in the chart and orders were placed for outstanding screenings/procedures as documented in the Assessment and Plan.      Patient Instructions (the written plan) was given to the patient at check out.      Pricilla Moran MD

## 2024-07-22 ENCOUNTER — APPOINTMENT (OUTPATIENT)
Dept: PRIMARY CARE | Facility: CLINIC | Age: 79
End: 2024-07-22
Payer: MEDICARE

## 2024-10-02 DIAGNOSIS — E78.00 PURE HYPERCHOLESTEROLEMIA: ICD-10-CM

## 2024-10-02 RX ORDER — ROSUVASTATIN CALCIUM 5 MG/1
5 TABLET, COATED ORAL NIGHTLY
Qty: 90 TABLET | Refills: 3 | Status: SHIPPED | OUTPATIENT
Start: 2024-10-02

## 2024-10-02 RX ORDER — ROSUVASTATIN CALCIUM 5 MG/1
5 TABLET, COATED ORAL NIGHTLY
Qty: 90 TABLET | Refills: 3 | Status: SHIPPED | OUTPATIENT
Start: 2024-10-02 | End: 2024-10-02 | Stop reason: SDUPTHER

## 2024-10-21 DIAGNOSIS — F41.9 ANXIETY: ICD-10-CM

## 2024-10-21 RX ORDER — ESCITALOPRAM OXALATE 10 MG/1
20 TABLET ORAL DAILY
Qty: 180 TABLET | Refills: 3 | Status: SHIPPED | OUTPATIENT
Start: 2024-10-21

## 2025-02-07 DIAGNOSIS — Z78.0 ASYMPTOMATIC MENOPAUSAL STATE: ICD-10-CM

## 2025-02-07 DIAGNOSIS — Z78.0 MENOPAUSE: Primary | ICD-10-CM

## 2025-02-07 DIAGNOSIS — Z00.00 ROUTINE HEALTH MAINTENANCE: ICD-10-CM

## 2025-06-16 ENCOUNTER — HOSPITAL ENCOUNTER (OUTPATIENT)
Dept: RADIOLOGY | Facility: CLINIC | Age: 80
Discharge: HOME | End: 2025-06-16
Payer: MEDICARE

## 2025-06-16 DIAGNOSIS — Z12.31 ENCOUNTER FOR SCREENING MAMMOGRAM FOR MALIGNANT NEOPLASM OF BREAST: ICD-10-CM

## 2025-06-16 PROCEDURE — 77067 SCR MAMMO BI INCL CAD: CPT | Performed by: RADIOLOGY

## 2025-06-16 PROCEDURE — 77067 SCR MAMMO BI INCL CAD: CPT

## 2025-06-16 PROCEDURE — 77063 BREAST TOMOSYNTHESIS BI: CPT | Performed by: RADIOLOGY

## 2025-06-23 ENCOUNTER — LAB (OUTPATIENT)
Dept: LAB | Facility: HOSPITAL | Age: 80
End: 2025-06-23
Payer: MEDICARE

## 2025-06-23 DIAGNOSIS — Z00.00 ENCOUNTER FOR GENERAL ADULT MEDICAL EXAMINATION WITHOUT ABNORMAL FINDINGS: Primary | ICD-10-CM

## 2025-06-23 LAB
25(OH)D3 SERPL-MCNC: 54 NG/ML (ref 30–100)
ALBUMIN SERPL BCP-MCNC: 4 G/DL (ref 3.4–5)
ALP SERPL-CCNC: 63 U/L (ref 33–136)
ALT SERPL W P-5'-P-CCNC: 15 U/L (ref 7–45)
ANION GAP SERPL CALC-SCNC: 12 MMOL/L (ref 10–20)
APPEARANCE UR: CLEAR
AST SERPL W P-5'-P-CCNC: 19 U/L (ref 9–39)
BACTERIA #/AREA URNS AUTO: ABNORMAL /HPF
BASOPHILS # BLD AUTO: 0.01 X10*3/UL (ref 0–0.1)
BASOPHILS NFR BLD AUTO: 0.3 %
BILIRUB SERPL-MCNC: 0.6 MG/DL (ref 0–1.2)
BILIRUB UR STRIP.AUTO-MCNC: NEGATIVE MG/DL
BUN SERPL-MCNC: 29 MG/DL (ref 6–23)
CALCIUM SERPL-MCNC: 9.7 MG/DL (ref 8.6–10.3)
CHLORIDE SERPL-SCNC: 107 MMOL/L (ref 98–107)
CHOLEST SERPL-MCNC: 218 MG/DL (ref 0–199)
CHOLESTEROL/HDL RATIO: 2.4
CO2 SERPL-SCNC: 26 MMOL/L (ref 21–32)
COLOR UR: ABNORMAL
CREAT SERPL-MCNC: 1 MG/DL (ref 0.5–1.05)
CRP SERPL HS-MCNC: 0.3 MG/L
EGFRCR SERPLBLD CKD-EPI 2021: 58 ML/MIN/1.73M*2
EOSINOPHIL # BLD AUTO: 0.12 X10*3/UL (ref 0–0.4)
EOSINOPHIL NFR BLD AUTO: 3.1 %
ERYTHROCYTE [DISTWIDTH] IN BLOOD BY AUTOMATED COUNT: 12.5 % (ref 11.5–14.5)
EST. AVERAGE GLUCOSE BLD GHB EST-MCNC: 105 MG/DL
GLUCOSE SERPL-MCNC: 94 MG/DL (ref 74–99)
GLUCOSE UR STRIP.AUTO-MCNC: NORMAL MG/DL
HBA1C MFR BLD: 5.3 % (ref ?–5.7)
HCT VFR BLD AUTO: 38.4 % (ref 36–46)
HDLC SERPL-MCNC: 91.9 MG/DL
HGB BLD-MCNC: 13.1 G/DL (ref 12–16)
IMM GRANULOCYTES # BLD AUTO: 0.02 X10*3/UL (ref 0–0.5)
IMM GRANULOCYTES NFR BLD AUTO: 0.5 % (ref 0–0.9)
KETONES UR STRIP.AUTO-MCNC: NEGATIVE MG/DL
LDLC SERPL CALC-MCNC: 114 MG/DL
LEUKOCYTE ESTERASE UR QL STRIP.AUTO: ABNORMAL
LYMPHOCYTES # BLD AUTO: 1.28 X10*3/UL (ref 0.8–3)
LYMPHOCYTES NFR BLD AUTO: 33.3 %
MCH RBC QN AUTO: 30.6 PG (ref 26–34)
MCHC RBC AUTO-ENTMCNC: 34.1 G/DL (ref 32–36)
MCV RBC AUTO: 90 FL (ref 80–100)
MONOCYTES # BLD AUTO: 0.31 X10*3/UL (ref 0.05–0.8)
MONOCYTES NFR BLD AUTO: 8.1 %
NEUTROPHILS # BLD AUTO: 2.1 X10*3/UL (ref 1.6–5.5)
NEUTROPHILS NFR BLD AUTO: 54.7 %
NITRITE UR QL STRIP.AUTO: NEGATIVE
NON HDL CHOLESTEROL: 126 MG/DL (ref 0–149)
NRBC BLD-RTO: 0 /100 WBCS (ref 0–0)
PH UR STRIP.AUTO: 5.5 [PH]
PLATELET # BLD AUTO: 189 X10*3/UL (ref 150–450)
POTASSIUM SERPL-SCNC: 4.2 MMOL/L (ref 3.5–5.3)
PROT SERPL-MCNC: 6.1 G/DL (ref 6.4–8.2)
PROT UR STRIP.AUTO-MCNC: NEGATIVE MG/DL
RBC # BLD AUTO: 4.28 X10*6/UL (ref 4–5.2)
RBC # UR STRIP.AUTO: ABNORMAL MG/DL
RBC #/AREA URNS AUTO: ABNORMAL /HPF
SODIUM SERPL-SCNC: 141 MMOL/L (ref 136–145)
SP GR UR STRIP.AUTO: 1.02
SQUAMOUS #/AREA URNS AUTO: ABNORMAL /HPF
TRIGL SERPL-MCNC: 61 MG/DL (ref 0–149)
TSH SERPL-ACNC: 3.14 MIU/L (ref 0.44–3.98)
UROBILINOGEN UR STRIP.AUTO-MCNC: NORMAL MG/DL
VLDL: 12 MG/DL (ref 0–40)
WBC # BLD AUTO: 3.8 X10*3/UL (ref 4.4–11.3)
WBC #/AREA URNS AUTO: ABNORMAL /HPF

## 2025-06-23 PROCEDURE — 80061 LIPID PANEL: CPT

## 2025-06-23 PROCEDURE — 80053 COMPREHEN METABOLIC PANEL: CPT

## 2025-06-23 PROCEDURE — 86141 C-REACTIVE PROTEIN HS: CPT

## 2025-06-23 PROCEDURE — 83036 HEMOGLOBIN GLYCOSYLATED A1C: CPT

## 2025-06-23 PROCEDURE — 81001 URINALYSIS AUTO W/SCOPE: CPT | Performed by: INTERNAL MEDICINE

## 2025-06-23 PROCEDURE — 82306 VITAMIN D 25 HYDROXY: CPT

## 2025-06-23 PROCEDURE — 87086 URINE CULTURE/COLONY COUNT: CPT | Mod: AHULAB | Performed by: INTERNAL MEDICINE

## 2025-06-23 PROCEDURE — 84443 ASSAY THYROID STIM HORMONE: CPT

## 2025-06-23 PROCEDURE — 85025 COMPLETE CBC W/AUTO DIFF WBC: CPT

## 2025-06-25 LAB — BACTERIA UR CULT: ABNORMAL

## 2025-06-27 ENCOUNTER — OFFICE VISIT (OUTPATIENT)
Dept: OBSTETRICS AND GYNECOLOGY | Facility: CLINIC | Age: 80
End: 2025-06-27
Payer: MEDICARE

## 2025-06-27 VITALS
SYSTOLIC BLOOD PRESSURE: 119 MMHG | HEART RATE: 87 BPM | DIASTOLIC BLOOD PRESSURE: 73 MMHG | BODY MASS INDEX: 22.36 KG/M2 | HEIGHT: 65 IN | WEIGHT: 134.2 LBS

## 2025-06-27 DIAGNOSIS — R82.71 ASYMPTOMATIC BACTERIURIA: ICD-10-CM

## 2025-06-27 DIAGNOSIS — R35.0 FREQUENT URINATION: ICD-10-CM

## 2025-06-27 DIAGNOSIS — R31.9 HEMATURIA, UNSPECIFIED TYPE: ICD-10-CM

## 2025-06-27 DIAGNOSIS — R39.15 URGENCY OF URINATION: ICD-10-CM

## 2025-06-27 DIAGNOSIS — N39.3 SUI (STRESS URINARY INCONTINENCE, FEMALE): Primary | ICD-10-CM

## 2025-06-27 DIAGNOSIS — N90.89 VULVAR LESION: ICD-10-CM

## 2025-06-27 LAB
POC APPEARANCE, URINE: CLEAR
POC BILIRUBIN, URINE: NEGATIVE
POC BLOOD, URINE: ABNORMAL
POC COLOR, URINE: ABNORMAL
POC GLUCOSE, URINE: NEGATIVE MG/DL
POC KETONES, URINE: NEGATIVE MG/DL
POC LEUKOCYTES, URINE: ABNORMAL
POC NITRITE,URINE: NEGATIVE
POC PH, URINE: 5.5 PH
POC PROTEIN, URINE: NEGATIVE MG/DL
POC SPECIFIC GRAVITY, URINE: 1.02
POC UROBILINOGEN, URINE: 0.2 EU/DL

## 2025-06-27 PROCEDURE — 1036F TOBACCO NON-USER: CPT | Performed by: OBSTETRICS & GYNECOLOGY

## 2025-06-27 PROCEDURE — 99215 OFFICE O/P EST HI 40 MIN: CPT | Performed by: OBSTETRICS & GYNECOLOGY

## 2025-06-27 PROCEDURE — 1126F AMNT PAIN NOTED NONE PRSNT: CPT | Performed by: OBSTETRICS & GYNECOLOGY

## 2025-06-27 PROCEDURE — 1159F MED LIST DOCD IN RCRD: CPT | Performed by: OBSTETRICS & GYNECOLOGY

## 2025-06-27 PROCEDURE — 81003 URINALYSIS AUTO W/O SCOPE: CPT | Mod: QW | Performed by: OBSTETRICS & GYNECOLOGY

## 2025-06-27 ASSESSMENT — PAIN SCALES - GENERAL: PAINLEVEL_OUTOF10: 0-NO PAIN

## 2025-06-27 ASSESSMENT — ENCOUNTER SYMPTOMS
LOSS OF SENSATION IN FEET: 0
OCCASIONAL FEELINGS OF UNSTEADINESS: 0
DEPRESSION: 0

## 2025-06-27 NOTE — PROGRESS NOTES
[facility] Department of Urogynecology   Paula Vasquez MD, MPH   [phone]    ASSESSMENT AND PLAN:   79 y.o. female being assessed for AMADOU with U>S, asymptomatic bacteruria, and vulvar lesion.     Diagnoses:   #1 AMADOU with urge > stress  #2 Asymptomatic bacteruria  #3 Vulvar lesion     Plan:   1. OAB  - Discussed first-line intervention methods such as PFPT and lifestyle changes (i.e., limiting fluids to 60oz in total per day, timed voiding every 2-3 hours, practice mind over bladder re-training, and avoiding bladder irritants).  - The second line treatment would be medications. If she wanted to start a medication in the future, we would recommend Myrbetriq.   - PFPT requisition sent today. Given list of physical therapists with their corresponding locations/contact information.    2. Asymptomatic bacteruria  - Discussed the difference between recurrent UTI vs. asymptomatic bacteruria. A true UTI is when the patient is symptomatic and has a + urine cx. Asymptomatic bacteruria occurs when the patient is asymptomatic of a UTI. This does not warrant treatment with antibiotics.     - Call the office if she is symptomatic of a UTI.   - she currently denies urgency (different from her baseline), frequency, pain with urination or blood in her urine    3. Vulvar lesion   - She was advised for biopsy to rule out REBEKAH. Other possible diagnoses could include hemangioma, LS, allergic reaction, or a fungal infection. The lesion has increased in size since last visit.   - RTC for biopsy.     4. RUSTY  - Referred to PFPT.      Follow-up in 4 months with Dr. Vasquez. She will schedule the biopsy in ~3 weeks when she is in town.     She goes back to [place] around the beginning of November.     Scribe Attestation:   Wilda MONTANO, am scribing for virtually, and in the presence of Paula Vasquez MD MPH on 06/27/2025 at 11:22 AM.     Problem List Items Addressed This Visit    None  Visit Diagnoses         RUSTY (stress  urinary incontinence, female)    -  Primary    Relevant Orders    Referral to Physical Therapy      Frequent urination        Relevant Orders    POCT UA Automated manually resulted (Completed)    Measure post void residual (Completed)    Referral to Physical Therapy      Urgency of urination        Relevant Orders    POCT UA Automated manually resulted (Completed)    Measure post void residual (Completed)    Referral to Physical Therapy      Hematuria, unspecified type        Relevant Orders    Urinalysis with Reflex Microscopic    Urine Culture      Asymptomatic bacteriuria          Vulvar lesion               I spent a total of 40 minutes in face to face and non face to face time.   I Dr. Vasquez, personally performed the services described in the documentation as scribed in my presence and confirm it is both complete and accurate.  Paula Vasquez MD, MPH, FACOG       Paula Vasquez MD, MPH, FACOG     Established    HISTORY OF PRESENT ILLNESS:     Bisi Doran is a 79 y.o. female who presents for follow up on incontinence. PVR is 0 ml by U/S.     Record Review:   - 9/29/23 Dr. Vasquez note: Continue E2 for [facility]SM. Discussed PFPT for AMADOU. RTC 6 months for vulvar skin lesion, 4x4 area of well delineated redness on the right buttock.  - Seen by Dr. Paula Vasquez on 08/24 For vaginal atrophy and deep dyspareunia, prescribed vaginal estrogen cream and referred her to pelvic floor PT. Recommended silicone based lubricants. Delineated area on her buttock that we will observe. For her urinary urgency and stress incontinence, start with lifestyle changes and PT.     Prolapse Symptoms:  - Denies feeling a vaginal bulge.      Urinary Symptoms:   - Urinary urgency has gotten worse.   - She is leaking a small amount with RUSTY.   - She is asymptomatic of a UTI. She says on Monday, her urine revealed possible UTI.   - Denies wearing pads for urine leakage. She does wear a specific kind of underwear when she travels.   - Most of  "the time, she can make it to the toilet before UUI. She has experienced key in the door incontinence.   - Voids 4-5x daily.   - Denies nocturia unless she drinks a lot of fluid before bed.   - UUI > RUSTY     OBGYN History and Sexual Activity:   - She is not sexually active.   - No longer using tv estrogen cream.   - Denies noticing vaginal dryness in her daily life.   - She says her buttock lesion is asymptomatic. Its never been biopsied.   - Denies hx of abnormal pap.        Past Medical History:     Medical History[1]       Past Surgical History:     Surgical History[2]      Medications:     Prior to Admission medications    Medication Sig Start Date End Date Taking? Authorizing Provider   ALPRAZolam (Xanax) 0.25 mg tablet Take 1 tablet (0.25 mg) by mouth once daily as needed for anxiety. 1/17/24  Yes Pricilla Moran MD   calcium carbonate (CALCIUM 600 ORAL) Take by mouth 2 times a day.   Yes Historical Provider, MD   cholecalciferol (Vitamin D-3) 50 mcg (2,000 unit) capsule Take 1 capsule (2,000 Units) by mouth once daily.   Yes Historical Provider, MD   clonazePAM (KlonoPIN) 0.5 mg tablet Take 1 tablet (0.5 mg) by mouth 2 times a day. 7/15/24  Yes Pricilla Moran MD   escitalopram (Lexapro) 10 mg tablet Take 2 tablets (20 mg) by mouth once daily. 10/21/24  Yes Pricilla Moran MD   rosuvastatin (Crestor) 5 mg tablet Take 1 tablet (5 mg) by mouth once daily at bedtime. 10/2/24  Yes Pricilla Moran MD        ROS  Review of Systems       PHYSICAL EXAM:    /73 (Patient Position: Sitting)   Pulse 87   Ht 1.651 m (5' 5\")   Wt 60.9 kg (134 lb 3.2 oz)   BMI 22.33 kg/m²   No LMP recorded. Patient is postmenopausal.    Declines chaperone for physical exam.      Well developed, well nourished, in no apparent distress.   Neurologic/Psychiatric:  Awake, Alert and Oriented times 3.  Affect normal.     GENITAL/URINARY:     External Genitalia:  The patient has normal appearing external " genitalia, normal skenes and bartholins glands, and a normal hair distribution.  It is non-tender with appropriate sensation. Red lesion noted on R buttock - it is a well circumscribed, red, flat lesion that blanches. It is 7x7 in size.     Urethral Meatus:  Size normal, Location normal, Lesions absent, Prolapse absent.    Urethra:  Fullness absent, Masses absent. Negative CST in supine position     Bladder:  Fullness absent, Masses absent, Tenderness absent.    Vagina:  General appearance normal, Discharge absent, Lesions absent. Hypo estrogenic vagina    Cervix: Normal, no discharge.   Uterus:  small, mobile  Adnexa:  no masses palpated     Anus/Perineum:  Lesions absent and Masses absent defer exam  Normal Perineum    OBGyn Exam      The patient has 3 out of 5 pelvic floor muscle strength.        Data and DIAGNOSTIC STUDIES REVIEWED   Imaging  No results found.    Cardiology, Vascular, and Other Imaging  No other imaging results found for the past 7 days     Lab Results   Component Value Date    URINECULTURE >=100,000 CFU/mL Proteus mirabilis (A) 06/23/2025      Lab Results   Component Value Date    GLUCOSE 94 06/23/2025    CALCIUM 9.7 06/23/2025     06/23/2025    K 4.2 06/23/2025    CO2 26 06/23/2025     06/23/2025    BUN 29 (H) 06/23/2025    CREATININE 1.00 06/23/2025     Lab Results   Component Value Date    WBC 3.8 (L) 06/23/2025    HGB 13.1 06/23/2025    HCT 38.4 06/23/2025    MCV 90 06/23/2025     06/23/2025             [1]   Past Medical History:  Diagnosis Date    Encounter for examination of ears and hearing without abnormal findings     Encounter for hearing test    Personal history of other endocrine, nutritional and metabolic disease 07/28/2015    History of hypercholesterolemia    Personal history of other mental and behavioral disorders     History of depression   [2]   Past Surgical History:  Procedure Laterality Date    APPENDECTOMY  7/1/1955    Appendectomy    TONSILLECTOMY   4/1/1951    Tonsillectomy

## 2025-06-27 NOTE — PATIENT INSTRUCTIONS
We discussed lifestyle changes includin) Aiming to drink around 60 oz total of fluids per day   2) Avoiding bladder irritants such as caffeine, nicotine, artificial sweeteners   3) Stop drinking fluids 3 hours before bedtime   4) Timed voids every 2-3 hours.        Return to clinic for a vulvar biopsy

## 2025-06-28 LAB
APPEARANCE UR: CLEAR
BACTERIA #/AREA URNS HPF: ABNORMAL /HPF
BILIRUB UR QL STRIP: NEGATIVE
COLOR UR: YELLOW
GLUCOSE UR QL STRIP: NEGATIVE
HGB UR QL STRIP: ABNORMAL
HYALINE CASTS #/AREA URNS LPF: ABNORMAL /LPF
KETONES UR QL STRIP: NEGATIVE
LEUKOCYTE ESTERASE UR QL STRIP: ABNORMAL
NITRITE UR QL STRIP: NEGATIVE
PH UR STRIP: 5.5 [PH] (ref 5–8)
PROT UR QL STRIP: NEGATIVE
RBC #/AREA URNS HPF: ABNORMAL /HPF
SERVICE CMNT-IMP: ABNORMAL
SP GR UR STRIP: 1.03 (ref 1–1.03)
SQUAMOUS #/AREA URNS HPF: ABNORMAL /HPF
WBC #/AREA URNS HPF: ABNORMAL /HPF

## 2025-06-29 LAB — BACTERIA UR CULT: NORMAL

## 2025-06-29 NOTE — PROGRESS NOTES
Physical Exam    Name Bisi Doran    Date of Service :6/29/2025      Bisi Doran  79 y.o. is here for an annual physical exam  She is extraordinarily healthy with past medical history significant for  Mild hyperlipidemia and takes low-dose Crestor did have coronary artery calcium score in 2014 of 45  Anxiety takes Lexapro routinely which has worked well rare alprazolam    Her anxiety is more situational with her daughter who has multiple mental health issues and some physical issues as well she did get a prescription for alprazolam in December and has only used 1 we did discuss alprazolam that is more for very acute situation she feels she might do better with something a little longer lasting I have given her a prescription for clonazepam which she has taken rarely and in fact has some of the alprazolam left from a few years ago she is willing to try the clonazepam  Osteopenia with T-score right around -2 last bone densitometry just performed July 2023 T-score -2.1 really very stable compared to previous study  Colonic polyps last colonoscopy 2023 clean so does not require further colonoscopy  Hospitalized for childbirth x 2    She did see urogynecology  Dr Vasquez for overactive bladder/incontinence and will start pelvic floor physical therapy  Note that she has had bacteria  here in her urine and they did not feel this was asymptomatic bacteriuria  Also found to have a vulvar lesion and will be having a biopsy to rule out REBEKAH      Medical History[1]    Surgical History[2]     Social History[3]     Social History     Social History Narrative    You are originally from [place]    Have lived in [place] for 53 years where you raised her family moved to [place] for your 's job as an     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    Physical Exam  Physical examination  Reveals a well-developed woman in no acute distress    appearance is younger than stated age  HEENT exam  Extraocular motion is intact  Tympanic membranes and external auditory canals are normal  Oropharynx is normal  There is no cervical lymphadenopathy appreciated  The thyroid is within normal limits    Lungs    clear to auscultation and percussion    Cardiovascular   regular rate and rhythm  No murmurs rubs or gallops are appreciated    Breast examination   No dominant masses nipple discharge or axillary lymphadenopathy is appreciated    Abdomen   soft nontender bowel sounds are positive   there is no organomegaly noted      Periphery  Pulses are present without deficits noted  No peripheral edema is noted    Musculoskeletal  Gait is normal  Is no joint erythema or swelling noted  Range of motion is within normal limits  Strength is 5 of 5 without deficits noted    Dermatology  No concerning skin lesions are noted    Neurology  No deficits are noted  Judgment appears appropriate  Mood and affect are appropriate         Results from last 7 days   Lab Units 06/23/25  0812   WBC AUTO x10*3/uL 3.8*   HEMOGLOBIN g/dL 13.1   HEMATOCRIT % 38.4   PLATELETS AUTO x10*3/uL 189   NEUTROS PCT AUTO % 54.7   LYMPHS PCT AUTO % 33.3   MONOS PCT AUTO % 8.1   EOS PCT AUTO % 3.1      Results from last 7 days   Lab Units 06/23/25  0812   HEMOGLOBIN A1C % 5.3     Results from last 7 days   Lab Units 06/23/25  0812   SODIUM mmol/L 141   POTASSIUM mmol/L 4.2   CHLORIDE mmol/L 107   CO2 mmol/L 26   BUN mg/dL 29*   CREATININE mg/dL  "1.00   CALCIUM mg/dL 9.7   PROTEIN TOTAL g/dL 6.1*   BILIRUBIN TOTAL mg/dL 0.6   ALK PHOS U/L 63   ALT U/L 15   AST U/L 19   GLUCOSE mg/dL 94      Results from last 7 days   Lab Units 06/23/25  0812   CHOLESTEROL mg/dL 218*   TRIGLYCERIDES mg/dL 61   HDL mg/dL 91.9           Results from last 7 days   Lab Units 06/23/25  0812   TSH mIU/L 3.14     Results from last 7 days   Lab Units 06/27/25  1022   QUEST PROTEIN U  NEGATIVE     No components found for: \"UA\"  Office Visit on 06/27/2025   Component Date Value Ref Range Status    POC Color, Urine 06/27/2025 Елена (A)  Straw, Yellow, Light-Yellow Final    POC Appearance, Urine 06/27/2025 Clear  Clear Final    POC Glucose, Urine 06/27/2025 NEGATIVE  NEGATIVE mg/dl Final    POC Bilirubin, Urine 06/27/2025 NEGATIVE  NEGATIVE Final    POC Ketones, Urine 06/27/2025 NEGATIVE  NEGATIVE mg/dl Final    POC Specific Gravity, Urine 06/27/2025 1.025  1.005 - 1.035 Final    POC Blood, Urine 06/27/2025 SMALL (1+) (A)  NEGATIVE Final    POC PH, Urine 06/27/2025 5.5  No Reference Range Established PH Final    POC Protein, Urine 06/27/2025 NEGATIVE  NEGATIVE mg/dl Final    POC Urobilinogen, Urine 06/27/2025 0.2  0.2, 1.0 EU/DL Final    Poc Nitrite, Urine 06/27/2025 NEGATIVE  NEGATIVE Final    POC Leukocytes, Urine 06/27/2025 SMALL (1+) (A)  NEGATIVE Final    COLOR 06/27/2025 YELLOW  YELLOW Final    APPEARANCE 06/27/2025 CLEAR  CLEAR Final    SPECIFIC GRAVITY 06/27/2025 1.026  1.001 - 1.035 Final    PH 06/27/2025 5.5  5.0 - 8.0 Final    GLUCOSE 06/27/2025 NEGATIVE  NEGATIVE Final    BILIRUBIN 06/27/2025 NEGATIVE  NEGATIVE Final    KETONES 06/27/2025 NEGATIVE  NEGATIVE Final    OCCULT BLOOD 06/27/2025 TRACE (A)  NEGATIVE Final    PROTEIN 06/27/2025 NEGATIVE  NEGATIVE Final    NITRITE 06/27/2025 NEGATIVE  NEGATIVE Final    LEUKOCYTE ESTERASE 06/27/2025 2+ (A)  NEGATIVE Final    WBC 06/27/2025 0-5  < OR = 5 /HPF Final    RBC 06/27/2025 0-2  < OR = 2 /HPF Final    SQUAMOUS EPITHELIAL CELLS " 06/27/2025 0-5  < OR = 5 /HPF Final    BACTERIA 06/27/2025 NONE SEEN  NONE SEEN /HPF Final    HYALINE CAST 06/27/2025 NONE SEEN  NONE SEEN /LPF Final    NOTE 06/27/2025    Final    Comment: This urine was analyzed for the presence of WBC,   RBC, bacteria, casts, and other formed elements.   Only those elements seen were reported.            CULTURE, URINE, ROUTINE 06/27/2025 SEE NOTE   Final    Comment:     CULTURE, URINE, ROUTINE         Micro Number:      [id number]    Test Status:       Final    Specimen Source:   Urine    Specimen Quality:  Adequate    Result:            Mixed genital madeleine isolated. These superficial                       bacteria are not indicative of a urinary tract                       infection. No further organism identification is                       warranted on this specimen. If clinically                       indicated, recollect clean-catch, mid-stream                       urine and transfer immediately to Urine Culture                       Transport Tube.       [unfilled]   Imaging  No results found.    Cardiology, Vascular, and Other Imaging  No other imaging results found for the past 2 days     The 10-year ASCVD risk score (Jag OATES, et al., 2019) is: 18.2%    Values used to calculate the score:      Age: 79 years      Sex: Female      Is Non- : No      Diabetic: No      Tobacco smoker: No      Systolic Blood Pressure: 119 mmHg      Is BP treated: No      HDL Cholesterol: 91.9 mg/dL      Total Cholesterol: 218 mg/dL   .  ECG: normal sinus rhythm, no blocks or conduction defects, no ischemic changes.  Sinus bradycardia incomplete right bundle branch block    Problem List Items Addressed This Visit    None      Assessment/Plan   #1 hypercholesteremia cholesterol is slightly higher than previous values increase Crestor to 10 mg daily and recheck in about 3 months    #2 overactive bladder following routinely with urogynecology we will start pelvic floor  therapy  3.  Vulvar lesion will have biopsy in near future  4.  Hoarseness or more gravelly voice just in the last few months will try Flonase or Nasacort nasal spray 2 squirts into each nostril daily in case some of this is postnasal drip could be silent reflux as well but also referral to ENT  5.  Health maintenance  Congratulated her on healthy lifestyle  Continue routine regular exercise  Just completed mammogram  Bone density is scheduled   Recommend flu and COVID vaccinations in fall  6 Anxiety more situational than anything else she will continue Lexapro on a routine basis she is going to try the clonazepam on a as needed basis but if she does feel the alprazolam works better I am okay with this that she uses it so infrequently         [1]   Past Medical History:  Diagnosis Date    Encounter for examination of ears and hearing without abnormal findings     Encounter for hearing test    Personal history of other endocrine, nutritional and metabolic disease 2015    History of hypercholesterolemia    Personal history of other mental and behavioral disorders     History of depression   [2]   Past Surgical History:  Procedure Laterality Date    APPENDECTOMY  1955    Appendectomy    TONSILLECTOMY  1951    Tonsillectomy   [3]   Social History  Tobacco Use    Smoking status: Never    Smokeless tobacco: Never   Vaping Use    Vaping status: Never Used   [4]   Family History  Problem Relation Name Age of Onset    Bipolar disorder Mother Aliya Donnelly          50 allergic reaction    Depression Mother Aliya Donnelly     Mental illness Mother Aliya Donnelly     Heart failure Father  80         87    No Known Problems Sister

## 2025-07-01 ENCOUNTER — APPOINTMENT (OUTPATIENT)
Dept: PRIMARY CARE | Facility: CLINIC | Age: 80
End: 2025-07-01
Payer: MEDICARE

## 2025-07-01 ENCOUNTER — OFFICE VISIT (OUTPATIENT)
Dept: PRIMARY CARE | Facility: CLINIC | Age: 80
End: 2025-07-01
Payer: MEDICARE

## 2025-07-01 VITALS
OXYGEN SATURATION: 96 % | HEART RATE: 70 BPM | SYSTOLIC BLOOD PRESSURE: 120 MMHG | HEIGHT: 65 IN | BODY MASS INDEX: 22.63 KG/M2 | WEIGHT: 135.8 LBS | DIASTOLIC BLOOD PRESSURE: 78 MMHG

## 2025-07-01 VITALS — DIASTOLIC BLOOD PRESSURE: 72 MMHG | SYSTOLIC BLOOD PRESSURE: 118 MMHG

## 2025-07-01 DIAGNOSIS — F41.9 ANXIETY: Primary | ICD-10-CM

## 2025-07-01 DIAGNOSIS — E78.00 PURE HYPERCHOLESTEROLEMIA: Primary | ICD-10-CM

## 2025-07-01 DIAGNOSIS — E78.00 PURE HYPERCHOLESTEROLEMIA: ICD-10-CM

## 2025-07-01 DIAGNOSIS — R49.0 HOARSE VOICE QUALITY: ICD-10-CM

## 2025-07-01 DIAGNOSIS — F41.9 ANXIETY: ICD-10-CM

## 2025-07-01 PROCEDURE — 1159F MED LIST DOCD IN RCRD: CPT | Performed by: INTERNAL MEDICINE

## 2025-07-01 PROCEDURE — G0439 PPPS, SUBSEQ VISIT: HCPCS | Performed by: INTERNAL MEDICINE

## 2025-07-01 PROCEDURE — 1160F RVW MEDS BY RX/DR IN RCRD: CPT | Performed by: INTERNAL MEDICINE

## 2025-07-01 RX ORDER — ROSUVASTATIN CALCIUM 10 MG/1
10 TABLET, COATED ORAL NIGHTLY
Qty: 90 TABLET | Refills: 3 | Status: SHIPPED | OUTPATIENT
Start: 2025-07-01

## 2025-07-01 ASSESSMENT — ACTIVITIES OF DAILY LIVING (ADL)
MANAGING_FINANCES: INDEPENDENT
GROCERY_SHOPPING: INDEPENDENT
DOING_HOUSEWORK: INDEPENDENT
TAKING_MEDICATION: INDEPENDENT

## 2025-07-01 ASSESSMENT — ENCOUNTER SYMPTOMS
LOSS OF SENSATION IN FEET: 0
DEPRESSION: 0
OCCASIONAL FEELINGS OF UNSTEADINESS: 0

## 2025-07-01 ASSESSMENT — PATIENT HEALTH QUESTIONNAIRE - PHQ9
2. FEELING DOWN, DEPRESSED OR HOPELESS: NOT AT ALL
1. LITTLE INTEREST OR PLEASURE IN DOING THINGS: NOT AT ALL
SUM OF ALL RESPONSES TO PHQ9 QUESTIONS 1 AND 2: 0

## 2025-07-01 NOTE — PROGRESS NOTES
Chief Complaint: Medicare Wellness Exam/Comprehensive Problem Focused Follow Up and Physical Exam    HPI:    She is extraordinarily healthy with past medical history significant for  Mild hyperlipidemia and takes low-dose Crestor did have coronary artery calcium score in 2014 of 45  Anxiety takes Lexapro routinely which has worked well rare alprazolam    Her anxiety is more situational with her daughter who has multiple mental health issues and some physical issues as well she did get a prescription for alprazolam in December and has only used 1 we did discuss alprazolam that is more for very acute situation she feels she might do better with something a little longer lasting I have given her a prescription for clonazepam which she has taken rarely and in fact has some of the alprazolam left from a few years ago she is willing to try the clonazepam  Osteopenia with T-score right around -2 last bone densitometry just performed July 2023 T-score -2.1 really very stable compared to previous study  Colonic polyps last colonoscopy 2023 clean so does not require further colonoscopy  Hospitalized for childbirth x 2     She did see urogynecology  Dr Vasquez for overactive bladder/incontinence and will start pelvic floor physical therapy  Note that she has had bacteria  here in her urine and they did not feel this was asymptomatic bacteriuria  Also found to have a vulvar lesion and will be having a biopsy to rule out REBEKAH    Active Problem List      Comprehensive Medical/Surgical/Social/Family History  Medical History[1]  Surgical History[2]  Social History     Social History Narrative    You are originally from [place]    Have lived in [place] for 53 years where you raised her family moved to [place] for your 's job as an     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      56 years     2 children both who live out of the state 1 in  "[place] and 1 in [place] and 3 grandchildren who are 16,18,19    Diet is healthy    Routine exercise walk play golf do have a     You have never been a smoker    You do occasionally drink alcohol        Increased stress at this time as daughter who has multiple health issues and you are primary support system      Avery Vu           Allergies and Medications  Penicillins  Medications Ordered Prior to Encounter[3]    Medications and Supplements  prescribed by me and other practitioners or clinical pharmacist (such as prescriptions, OTC's, herbal therapies and supplements) were reviewed and documented in the medical record.    Tobacco/Alcohol/Opioid use, as well as Illicit Drug Use was screened for/reviewed and documented in Social History section and medication list as appropriate  Activities of Daily Living  In your present state of health, do you have any difficulty performing the following activities?:   Preparing food and eating?: No  Bathing yourself: No  Getting dressed: No  Using the toilet:No  Moving around from place to place: No  In the past year have you fallen or had a near fall?:No    Depression Screen  (Note: if answer to either of the following is \"Yes\", then a more complete depression screening is indicated)   Q1: Over the past two weeks, have you felt down, depressed or hopeless? No  Q2: Over the past two weeks, have you felt little interest or pleasure in doing things? No    Current exercise habits: She exercises routinely a combination of cardio and resistance most days of the week  Dietary issues discussed: Yes  Hearing difficulties: No  Safe in current home environment: yes  Visual Acuity assessed: no  Cognitive Impairment assessed: no       Advance directives  Advanced Care Planning (including a Living Will, Healthcare POA, as well as specific end of life choices and/or directives), was discussed f.    Cardiac Risk Assessment  Cardiovascular risk was discussed and, if " "needed, lifestyle modifications recommended, including nutritional choices, exercise, and elimination of habits contributing to risk. We agreed on a plan to reduce the current cardiovascular risk based on above discussion as needed.  Aspirin use/disuse was discussed after reviewing the updated guidelines below:    Consider low dose Aspirin ( mg) use if the benefit for cardiovascular disease prevention outweighs risk for bleeding complications.   In general, low dose ASA should be considered:  In patients WITHOUT prior MI/stroke/PAD (primary prevention):   a. Age <60: Use if 10-year cardiovascular disease risk >20%, with discussion of risks and benefits with patient  b. Age 60-<70: Use if 10-year cardiovascular disease risk >20% and low bleeding (e.g., gastrointenstinal) risk, with discussion of risks and benefits with patient  c. Age >=70: Do not use    In patients WITH prior MI/stroke/PAD (secondary prevention):   Generally use unless extremely high bleeding (e.g., gastrointenstinal) risk, with discussion of risks and benefits with patient    ROS otherwise negative aside from what was mentioned above in HPI.    Vitals  /78   Pulse 70   Ht 1.651 m (5' 5\")   Wt 61.6 kg (135 lb 12.9 oz)   SpO2 96%   BMI 22.60 kg/m²   Body mass index is 22.6 kg/m².  Physical Exam  Gen: Alert, NAD  HEENT:  PERRLA, EOMI, conjunctiva and sclera normal in appearance. External auditory canals/TMs normal; Oral cavity and posterior pharynx without lesions/exudate  Neck:  Supple with FROM; No masses/nodes palpable; Thyroid nontender and without nodules; No PARTH  Respiratory:  Lungs CTAB  Cardiovascular:  Heart RRR. No M/R/G. Peripheral pulses equal bilaterally  Abdomen:  Soft, nontender, BS present throughout; No R/G/R; No HSM or masses palpated  Extremities:  FROM all extremities; Muscle strength grossly normal with good tone  Neuro:  CN II-XII intact; Reflexes 2+/2+; Gross motor and sensory intact  Skin:  No suspicious " lesions present  Breast: No masses, skin lesions or nipple discharges, no axillary lymphadenopathy    Assessment and Plan:  Problem List Items Addressed This Visit    None    #1 hypercholesteremia cholesterol is slightly higher than previous values we will increase Crestor to 10 mg daily recheck 3 months     #2 overactive bladder following routinely with urogynecology we will start pelvic floor therapy  3.  Vulvar lesion will have biopsy in near future  4.  Hoarseness or more gravelly voice just in the last few months will try Flonase or Nasacort nasal spray 2 squirts into each nostril daily in case some of this is postnasal drip could be silent reflux as well but also referral to ENT  5.  Health maintenance  Congratulated her on healthy lifestyle  Continue routine regular exercise  Just completed mammogram  Bone density is scheduled   Recommend flu and COVID vaccinations in fall  #6 anxiety continue Lexapro she will try clonazepam as needed which she rarely uses if prefers alprazolam I am okay with this that she uses it so infrequently      During the course of the visit the patient was educated and counseled about age appropriate screening and preventive services. Completed preventive screenings were documented in the chart and orders were placed for outstanding screenings/procedures as documented in the Assessment and Plan.      Patient Instructions (the written plan) was given to the patient at check out.      Pricilla Moran MD          [1]   Past Medical History:  Diagnosis Date    Encounter for examination of ears and hearing without abnormal findings     Encounter for hearing test    Personal history of other endocrine, nutritional and metabolic disease 07/28/2015    History of hypercholesterolemia    Personal history of other mental and behavioral disorders     History of depression   [2]   Past Surgical History:  Procedure Laterality Date    APPENDECTOMY  7/1/1955    Appendectomy    TONSILLECTOMY   4/1/1951    Tonsillectomy   [3]   Current Outpatient Medications on File Prior to Visit   Medication Sig Dispense Refill    calcium carbonate (CALCIUM 600 ORAL) Take by mouth 2 times a day.      cholecalciferol (Vitamin D-3) 50 mcg (2,000 unit) capsule Take 1 capsule (2,000 Units) by mouth once daily.      escitalopram (Lexapro) 10 mg tablet Take 2 tablets (20 mg) by mouth once daily. 180 tablet 3    rosuvastatin (Crestor) 5 mg tablet Take 1 tablet (5 mg) by mouth once daily at bedtime. 90 tablet 3    ALPRAZolam (Xanax) 0.25 mg tablet Take 1 tablet (0.25 mg) by mouth once daily as needed for anxiety. 7 tablet 0    clonazePAM (KlonoPIN) 0.5 mg tablet Take 1 tablet (0.5 mg) by mouth 2 times a day. 15 tablet 0     No current facility-administered medications on file prior to visit.

## 2025-07-01 NOTE — PATIENT INSTRUCTIONS
It was good to see you.  You are doing a good job with your overall health care.   Cholesterol is slightly elevated and slightly higher than last year  increase Rosuvastatin to 10 mg     You are due for bone density study  From the standpoint of anxiety continue the Lexapro try the clonazepam if needed and see if this works I do prefer clonazepam to alprazolam but you use the benzodiazepines so infrequently I am really okay with either 1  Follow-up with Dr Vasquez for the vulvar lesion which she is ruling out is something called REBEKAH which is vulvar intraepithelial neoplasia which if present treatment is local resection    Follow-up with pelvic floor therapy hopefully will be helpful  From the hoarseness of voice or gravelly voice please have you try Flonase or Nasacort nasal spray 2 squirts in each nostril daily in case some of this is from postnasal drip.  Will have you follow-up with ENT voice specially too could be silent reflux as well and may give you a trial of something like Prilosec  Continue routine regular exercise  Up-to-date with mammogram  I do recommend flu vaccination and COVID-19 vaccination in the fall

## 2025-07-03 ENCOUNTER — OFFICE VISIT (OUTPATIENT)
Dept: OTOLARYNGOLOGY | Facility: CLINIC | Age: 80
End: 2025-07-03
Payer: MEDICARE

## 2025-07-03 VITALS — WEIGHT: 135 LBS | HEIGHT: 65 IN | BODY MASS INDEX: 22.49 KG/M2

## 2025-07-03 DIAGNOSIS — R09.82 POST-NASAL DRAINAGE: ICD-10-CM

## 2025-07-03 DIAGNOSIS — R09.81 NASAL CONGESTION: ICD-10-CM

## 2025-07-03 DIAGNOSIS — R49.0 HOARSENESS OF VOICE: Primary | ICD-10-CM

## 2025-07-03 DIAGNOSIS — K21.9 LARYNGOPHARYNGEAL REFLUX (LPR): ICD-10-CM

## 2025-07-03 PROCEDURE — 1160F RVW MEDS BY RX/DR IN RCRD: CPT

## 2025-07-03 PROCEDURE — 1159F MED LIST DOCD IN RCRD: CPT

## 2025-07-03 PROCEDURE — 31579 LARYNGOSCOPY TELESCOPIC: CPT

## 2025-07-03 PROCEDURE — 99203 OFFICE O/P NEW LOW 30 MIN: CPT

## 2025-07-03 PROCEDURE — 1036F TOBACCO NON-USER: CPT

## 2025-07-03 RX ORDER — MAGNESIUM CARB/ALUMINUM HYDROX 105-160MG
2 TABLET,CHEWABLE ORAL NIGHTLY
Qty: 60 TABLET | Refills: 2 | Status: SHIPPED | OUTPATIENT
Start: 2025-07-03 | End: 2025-08-02

## 2025-07-03 ASSESSMENT — PATIENT HEALTH QUESTIONNAIRE - PHQ9
SUM OF ALL RESPONSES TO PHQ9 QUESTIONS 1 AND 2: 0
1. LITTLE INTEREST OR PLEASURE IN DOING THINGS: NOT AT ALL
2. FEELING DOWN, DEPRESSED OR HOPELESS: NOT AT ALL

## 2025-07-03 NOTE — PROGRESS NOTES
Reason For Consult  Chief Complaint   Patient presents with    New Patient Visit    Hoarseness     Dealing with it for at least 6 weeks comes and goes.        HISTORY OF PRESENT ILLNESS:  Bisi Doran, who is a 79 y.o. female presenting for an initial visit for hoarseness of voice.  The patient reports that she has been experiencing intermittent hoarseness of voice over the past 6 weeks. Patient reports that her breathing has been stable. Patient denies any issues when swallowing solids, liquids, or pills. Patient reports that she does not typically experience symptoms of heartburn/acid reflux. Patient reports no smoking history.     Past Medical History  She has a past medical history of Encounter for examination of ears and hearing without abnormal findings, Personal history of other endocrine, nutritional and metabolic disease (07/28/2015), and Personal history of other mental and behavioral disorders.  Surgical History  She has a past surgical history that includes Appendectomy (7/1/1955) and Tonsillectomy (4/1/1951).     Social History  She reports that she has never smoked. She has never used smokeless tobacco. No history on file for alcohol use and drug use.    Allergies  Penicillins    Review of Systems  All 10 systems were reviewed and negative except for above.      Physical Exam  CONSTITUTIONAL: Well developed, well nourished.    VOICE: mild hoarseness  RESPIRATION: Breathing comfortably, no stridor.    NEURO: Alert and oriented x3, cranial nerves II-XII intact and symmetric bilaterally.    EARS: Normal external ears, external auditory canals, normal hearing to conversational voice.    NOSE: External nose midline, anterior rhinoscopy is normal with limited visualization to the anterior aspect of the interior turbinates. No lesions noted.     ORAL CAVITY/OROPHARYNX/LIPS: Normal mucous membranes, normal floor of mouth/tongue/OP, no masses or lesions are noted.    SKIN: Neck skin is intact   PSYCH: Alert  "and oriented with appropriate mood and affect.        Last Recorded Vitals  Height 1.651 m (5' 5\"), weight 61.2 kg (135 lb).    Procedure  PROCEDURE NOTE:  Recommended flexible laryngoscopy/stroboscopy.  Risks, benefits,  and alternatives were explained.  They wish to proceed and provide verbal consent.     PROCEDURE:  Flexible laryngoscopy with stroboscopy, CPT 44506     POSTPROCEDURE DIAGNOSIS: mild muscle tension dysphonia, Mild LPR     INDICATIONS: Inability to tolerate mirror exam or abnormal findings on mirror, Flexible Laryngoscopy/Stroboscopy performed to assess one of the followin. Diagnosis of symptomatic disorder involving the voice, swallow, upper aerodigestive tract, including PRAKASH disorders, or  2. Preoperative evaluation of vocal cord function for individuals undergoing surgery where the RLN or vagus nerves are at risk of injury, or  3. Further evaluation of abnormalities of the upper aerodigestive tract discovered by another modality, such as CT, MRI, bronchoscopy or EGD    Description of Procedure:    After adequate afrin and lidocaine spray, I advanced the endoscope.  Visualization of the nasopharynx, vallecula, posterior pharyngeal walls, pyriform, epiglottis and post cricoid areas was unremarkable.  The following laryngeal findings were noted:    vocal cord movement was full range of motion bilaterally  closure was posterior gap  Mucosal wave was in phase  Compression was increased AP > FVC   edema was mild bilaterally  interarytenoid edema present  lesions were none  the subglottis was widely patent  Pharyngeal wall squeeze was normal    Procedure well tolerated.     ASSESSMENT AND PLAN:   This is an initial visit for hoarseness of voice with clinical findings notable for normal vc movement and with posterior gap. No masses or lesions. Mild edema present of the bilateral vocal cords. Mild IA edema present. Patient has post thick post nasal drainage. Discussed use of OTC Nasonex daily over " the next month. Discussed use of Gaviscon 2 tablets before bed for LPR. Discussed the option of speech therapy for mild MTD, patient would like to try nasal spray and reflux control first. Patient can message in about one month via [employer] to update on symptoms. Patient agreeable to plan. All questions answered.

## 2025-07-15 ENCOUNTER — APPOINTMENT (OUTPATIENT)
Dept: RADIOLOGY | Facility: CLINIC | Age: 80
End: 2025-07-15
Payer: MEDICARE

## 2025-07-22 ENCOUNTER — HOSPITAL ENCOUNTER (OUTPATIENT)
Dept: RADIOLOGY | Facility: CLINIC | Age: 80
Discharge: HOME | End: 2025-07-22
Payer: MEDICARE

## 2025-07-22 DIAGNOSIS — Z78.0 ASYMPTOMATIC MENOPAUSAL STATE: ICD-10-CM

## 2025-07-22 PROCEDURE — 77080 DXA BONE DENSITY AXIAL: CPT

## 2025-07-22 PROCEDURE — 77080 DXA BONE DENSITY AXIAL: CPT | Performed by: RADIOLOGY

## 2025-07-25 ENCOUNTER — APPOINTMENT (OUTPATIENT)
Dept: OBSTETRICS AND GYNECOLOGY | Facility: CLINIC | Age: 80
End: 2025-07-25
Payer: MEDICARE

## 2025-07-31 ENCOUNTER — OFFICE VISIT (OUTPATIENT)
Dept: OBSTETRICS AND GYNECOLOGY | Facility: CLINIC | Age: 80
End: 2025-07-31
Payer: MEDICARE

## 2025-07-31 VITALS
SYSTOLIC BLOOD PRESSURE: 148 MMHG | HEIGHT: 65 IN | BODY MASS INDEX: 22.36 KG/M2 | WEIGHT: 134.2 LBS | DIASTOLIC BLOOD PRESSURE: 78 MMHG | HEART RATE: 59 BPM

## 2025-07-31 DIAGNOSIS — N39.3 SUI (STRESS URINARY INCONTINENCE, FEMALE): ICD-10-CM

## 2025-07-31 DIAGNOSIS — N32.81 OAB (OVERACTIVE BLADDER): ICD-10-CM

## 2025-07-31 DIAGNOSIS — N90.89 VULVAR LESION: Primary | ICD-10-CM

## 2025-07-31 PROCEDURE — 1159F MED LIST DOCD IN RCRD: CPT | Performed by: OBSTETRICS & GYNECOLOGY

## 2025-07-31 PROCEDURE — 99214 OFFICE O/P EST MOD 30 MIN: CPT | Performed by: OBSTETRICS & GYNECOLOGY

## 2025-07-31 PROCEDURE — 1126F AMNT PAIN NOTED NONE PRSNT: CPT | Performed by: OBSTETRICS & GYNECOLOGY

## 2025-07-31 PROCEDURE — 56605 BIOPSY OF VULVA/PERINEUM: CPT | Performed by: OBSTETRICS & GYNECOLOGY

## 2025-07-31 PROCEDURE — 99214 OFFICE O/P EST MOD 30 MIN: CPT | Mod: 25 | Performed by: OBSTETRICS & GYNECOLOGY

## 2025-07-31 ASSESSMENT — PAIN SCALES - GENERAL: PAINLEVEL_OUTOF10: 0-NO PAIN

## 2025-07-31 ASSESSMENT — ENCOUNTER SYMPTOMS
OCCASIONAL FEELINGS OF UNSTEADINESS: 0
LOSS OF SENSATION IN FEET: 0

## 2025-07-31 NOTE — PROGRESS NOTES
[facility] Department of Urogynecology   Paula Vasquez MD, MPH   [phone]    ASSESSMENT AND PLAN:   80 y.o. female with a personal history of AMADOU with U>S, asymptomatic bacteruria, and vulvar lesion presents today for a biopsy.     Diagnoses:   #1 Vulvar lesion      Plan:   1. Vulvar lesion   - The patient has a well-circumscribed, red, flat lesion on the right buttock, measuring seven by seven centimeters, which blanches.   - Biopsy performed today. Pathology results can take up to two weeks. They will be in her MyChart. If it is a normal result, then she won't need to come in. If it's an abnormal result, then she will receive a call and return for a clinic visit.   - Okay to exercise tomorrow if she feels up to it. Encouraged to listen to her body, and may have slight spotting. Encouraged to apply pressure to the area if bleeding occurs.    - Mentioned to reach out to the office if she experiences pain or swelling that doesn't subside.    2. OAB  - PFPT- first visit on 8/1/25    3. RUSTY  - PFPT    4. ASB  - no sx of UTI    Follow-up in 3-4 weeks when the results are back.      Scribe Attestation  By signing my name below, IOksana Scribe attest that this documentation has been prepared under the direction and in the presence of Paula Vasquez MD MPH.  Scribe Attestation:   I, Wilda Alejandra, am scribing for virtually, and in the presence of Paula Vasquez MD MPH on 07/31/2025 at 5:20 PM.       Problem List Items Addressed This Visit    None  Visit Diagnoses         Vulvar lesion    -  Primary    Relevant Orders    DERMATOPATHOLOGY -DERMPATH LAB           I spent a total of 30 minutes in face to face and non face to face time.     I Dr. Vasquez, personally performed the services described in the documentation as scribed in my presence and confirm it is both complete and accurate.  Paula Vasquez MD, MPH, FACOG       Paula Vasquez MD, MPH, FACOG     Established    HISTORY OF PRESENT  ILLNESS:   80 year old female presents to the clinic for a vulvar biopsy.    Record Review:   - 6/27/2025 Dr. Vasquez note RE: Discussed lifestyle modifications for OAB and referred to PFPT.  Reviewed symptoms of a UTI and when to call the office vs. Asymptomatic bacteruria. Planned to proceed with biopsy for vulvar lesion.  - 9/29/23 Dr. Vasquez note: Continue E2 for GSM. Discussed PFPT for AMADOU. RTC 6 months for vulvar skin lesion, 4x4 area of well delineated redness on the right buttock.  - Seen by Dr. Paula Vasquez on 08/24 For vaginal atrophy and deep dyspareunia, prescribed vaginal estrogen cream and referred her to pelvic floor PT. Recommended silicone based lubricants. Delineated area on her buttock that we will observe. For her urinary urgency and stress incontinence, start with lifestyle changes and PT.       Medications:       Prior to Admission medications   Medication Sig Start Date End Date Taking? Authorizing Provider   ALPRAZolam (Xanax) 0.25 mg tablet Take 1 tablet (0.25 mg) by mouth once daily as needed for anxiety. 1/17/24   Pricilla Moran MD   aluminum hydrox-magnesium carb (Gaviscon Extra Strength) 160-105 mg tablet,chewable Chew and swallow 2 tablets once daily at bedtime. 7/3/25 8/2/25  MADHU Rubio-CNP   calcium carbonate (CALCIUM 600 ORAL) Take by mouth 2 times a day.    Historical Provider, MD   cholecalciferol (Vitamin D-3) 50 mcg (2,000 unit) capsule Take 1 capsule (2,000 Units) by mouth once daily.    Historical Provider, MD   clonazePAM (KlonoPIN) 0.5 mg tablet Take 1 tablet (0.5 mg) by mouth 2 times a day. 7/15/24   Pricilla Moran MD   escitalopram (Lexapro) 10 mg tablet Take 2 tablets (20 mg) by mouth once daily. 10/21/24   Pricilla Moran MD   rosuvastatin (Crestor) 10 mg tablet Take 1 tablet (10 mg) by mouth once daily at bedtime. 7/1/25   Pricilla Moran MD        ROS  Review of Systems       PHYSICAL EXAM:      /78 (BP Location: Left  "arm, Patient Position: Sitting)   Pulse 59   Ht 1.651 m (5' 5\")   Wt 60.9 kg (134 lb 3.2 oz)   BMI 22.33 kg/m²      No LMP recorded. Patient is postmenopausal.      Declines chaperone for physical exam.      Well developed, well nourished, in no apparent distress.   Neurologic/Psychiatric:  Awake, Alert and Oriented times 3.  Affect normal.     GENITAL/URINARY:       Physical Exam  Genitourinary:      Genitourinary Comments: Red lesion noted on right buttock, well-circumscribed. Red flat lesion that blanches, seven by seven centimeters in size.           Patient ID: Bisi Doran is a 80 y.o. female.    Biopsy vulva    Date/Time: 7/31/2025 3:30 PM    Performed by: Paula Vasquez MD MPH  Authorized by: Paula Vasquez MD MPH    Consent:     Consent obtained:  Written    Consent given by:  Patient    Risks & benefits discussed with patient: Yes    Indication:     Indications: lesion    Procedure Details:     Hemostasis: silver nitrate    Post-procedure Details:     Patient tolerance of procedure:  Tolerated well, no immediate complications      Data and DIAGNOSTIC STUDIES REVIEWED   Imaging  No results found.    Cardiology, Vascular, and Other Imaging  No other imaging results found for the past 7 days     Lab Results   Component Value Date    URINECULTURE SEE NOTE 06/27/2025      Lab Results   Component Value Date    GLUCOSE 94 06/23/2025    CALCIUM 9.7 06/23/2025     06/23/2025    K 4.2 06/23/2025    CO2 26 06/23/2025     06/23/2025    BUN 29 (H) 06/23/2025    CREATININE 1.00 06/23/2025     Lab Results   Component Value Date    WBC 3.8 (L) 06/23/2025    HGB 13.1 06/23/2025    HCT 38.4 06/23/2025    MCV 90 06/23/2025     06/23/2025      "

## 2025-08-01 ENCOUNTER — EVALUATION (OUTPATIENT)
Dept: PHYSICAL THERAPY | Facility: CLINIC | Age: 80
End: 2025-08-01
Payer: MEDICARE

## 2025-08-01 DIAGNOSIS — N39.46 URINARY INCONTINENCE, MIXED: Primary | ICD-10-CM

## 2025-08-01 PROCEDURE — 97530 THERAPEUTIC ACTIVITIES: CPT | Mod: GP | Performed by: PHYSICAL THERAPIST

## 2025-08-01 PROCEDURE — 97161 PT EVAL LOW COMPLEX 20 MIN: CPT | Mod: GP | Performed by: PHYSICAL THERAPIST

## 2025-08-01 ASSESSMENT — ENCOUNTER SYMPTOMS
DEPRESSION: 0
LOSS OF SENSATION IN FEET: 0
OCCASIONAL FEELINGS OF UNSTEADINESS: 0

## 2025-08-01 NOTE — PATIENT INSTRUCTIONS
Please call the office with any questions or concerns.   [phone]     SOME SUGGESTED VULVAR PAIN & ITCHING MEASURES     The vulva is the external genitalia in the female. The skin of the vulva can be quite sensitive. Because it is moist and frequently subjected to friction while sitting and moving, this area can be easily injured. There are various strategies that can be used to prevent irritation and allow the vulva to heal. Keeping this area dry can accelerate healing. Chemicals found in toilet tissues, laundry soaps and detergents that come in contact with the vulva can cause irritation.   Avoiding contact with potential irritants that contain chemicals is important. Fabric softeners in undergarments, chemicals in deodorant soaps, bubble baths, feminine hygiene spray and panty liners etc., can all cause irritation to the vulva. The following recommendations are specific measures that can help minimize vulvar irritation.   Wear white 100% cotton underwear and do not wear underwear at night. Do not wear pantyhose, tights, or other close-fitting clothes. Enclosing this area with synthetic fibers holds both heat and moisture in the skin, conditions which potentiate the development of infections.   Tight-fitting clothes may also increase your symptoms of discomfort.   After washing underwear, put it through at least one whole cycle with water only. Some women have suffered needlessly from irritants in detergents whose residue was left in clothes by incomplete rinsing. Rinsing clothes thoroughly is more important than which detergent is used although to be on the safe side, the milder the soap, the better. Wash new underwear before wearing. Fabric softeners and dryer sheets should not be used.   Rinse skin off with plain water frequently. Use tap water, distilled water, sitz baths, squirt bottles, or bidets. Additionally, hand held showers can be helpful for rinsing the vulva. After rinsing, pat the skin gently  dry.   If the anus is irritated, consider cutting white flannel squares and placing the square in warm water. Wipe the anus with the wet flannel and discard or wash the flannel.   Use very mild soap for bathing. It is best not to use any soaps on the vulva. The vulva should be rinsed with warm water. Unscented soaps or soaps for sensitive skin are best to use on the body. Special soap products can be found at pharmacies or health food stores. Remember that frequent baths with soaps may increase the irritation.   A compress of oiled Aveeno (a powdered oatmeal bath treatment) has been recommended by some. It is placed over the vulva three to four times a day. Put two tablespoons of Aveeno in one quart of water. Mix in a jar and refrigerate. This is often helpful after intercourse or when symptoms of burning and itching are present.   Some patients find the use of cool gel packs on the vulva to be helpful.   Do not use products with benzocaine.   Consider using 100% cotton menstrual pads and tampons. Many women with vulvar pain experience a significant increase in irritation and pain every month when they use commercial paper pads or tampons. This monthly increase in pain can often be reduced by using 100% washable and reusable cotton menstrual pads. Pure cotton tampons are available.   Don’t sit or remain in a wet bathing suit for prolonged periods.   Additionally, it is often recommended that the vulva is left uncovered at night (i.e. no underwear) to allow adequate exposure to the air.       Adapted From:   The Vulvar Pain Foundation, “Natural and Prophylactic Measures Suggested”, Vulvar Pain   Newsletter 1993: Sprin-6   The Interstitial Cystitis Association Vulvar Pain handout

## 2025-08-01 NOTE — PROGRESS NOTES
Physical Therapy    PELVIC FLOOR EVALUATION AND TREATMENT    Name: Bisi Doran  MRN: [id number]  : 1945  Today's Date: 25     Time Calculation  Start Time: 855  Stop Time: 1000  Time Calculation (min): 65 min    PT Evaluation Time Entry  PT Evaluation (Low) Time Entry: 25  PT Therapeutic Procedures Time Entry  Therapeutic Activity Time Entry: 25     Visit: 1  Insurance: Medicare  Auth: No     Assessment:   Pt presents to clinic with chief complaint of urge predominant UI that typically occurs in the morning when she is first getting out of bed and on her way to the bathroom. Internal examination performed with informed pt consent demonstrated mild pelvic floor tension and mild reduction in pelvic floor strength. Pt will benefit from skilled therapy to address current impairments for improved bladder control and reduced frequency and amount of urinary leakage     Plan:   Treatment/Interventions: Cryotherapy, Dry needling, Education/ Instruction, Electrical stimulation, Hot pack, Manual therapy, Neuromuscular re-education, Self care/ home management, Therapeutic exercises, Therapeutic activities  PT Plan: Skilled PT  PT Frequency: 1 time per week  Duration: 6 weeks  Onset Date: 25  Certification Period Start Date: 25  Certification Period End Date: 10/30/25  Rehab Potential: Good  Plan of Care Agreement: Patient      Current Problem:  1. Urinary incontinence, mixed  Follow Up In Physical Therapy          Subjective   General  Reason for Referral: Mixed UI  Referred By: Dr. Paula Vasquez  Preferred Learning Style: verbal, visual, written  Precautions  STEADI Fall Risk Score (The score of 4 or more indicates an increased risk of falling): 0  Precautions Comment: None       Objective   PELVIC HISTORY:    Chief Complaint/Description of Symptoms:  Pt presents to clinic with chief complaint of predominant urge UI; she does also endorse stress UI       HPI:   Pt describes urge UI when she has  urgency and is on her way to the bathroom, tends to be the worst first thing in the morning when she is getting out of bed to go void     Pt does endorse intermittent stress UI with hard coughing, sneezing   Leakage is small in terms of amount     Pt does also report previous hx of pain with IC; at this time she is not sexually active and this is not necessarily a primary goal for her at this time  She has no pain at baseline       Home Environment/Social Factors/Occupation:   Hx of appendectomy at age 12  Hx of tubal ligation   Tubal ligation       Vaginal births with her children       PELVIC PAIN:     No pelvic pain at baseline     Since onset, the symptoms are: unchanged; over the last year its gotten worse   Pain when emptying bladder: No  Pain with wiping or tight clothing: No   Pain with intercourse: previously pain with IC, not currently sexually active   History of back pain: Yes, intermittent; feels like this is age related and not significantly limiting at this time     EXERCISE:  Do you do Kegels? No   Current exercise regime:     BLADDER:     Excessive Urinary Urgency: sometimes   Daytime Voiding Frequency: double voiding in the AM; probably going about 6-8x/day   Nighttime Voiding Frequency: generally not getting up; if getting up 1x/night   Unintentional urine loss frequency: possibly daily; 1x/day   Leakage occurs with: urgency    Leakage amount: small  Difficulty initiating flow of urine: No  Slow/weak urine stream: No  Difficulty starting urine stream/push to urinate: No  Able to completely empty bladder: feels like able to except in the morning hours when she feels she has to double void   Tests performed by doctor: No   Frequent UTI's: No     BOWEL:     Excessive Bowel Urgency: No  BM Frequency: about 5x/week   Frequent Diarrhea: No   Frequent constipation/straining/incomplete emptying: periodically   [place] Stool Scale rating: Type 3-4    Unintentional stool loss: No   Stool loss frequency:  N/A    Tests performed by doctor: No     EXTERNAL OBSERVATION:  Voluntary Contraction: present   Voluntary Relaxation: present  Involuntary Contraction: minimal; paradoxical; improved with Knack   Involuntary Relaxation present         INTERNAL VAGINAL EXAMINATION:  PFM Strength: 2/5      INTERNAL PALPATION:   Tenderness to palpation and stiffness noted at introitus   Mild tenderness with palpation of bulbocavernosus, ischiocavernosus     No tenderness to levator ani group, OI bilaterally     Mild tension noted internally with palpation     Flat, red lesion noted at buttock; some scabbing noted where skin biopsy was performed; pt is following up with OBGYN for this  Lesion does not give pt any trouble, pain or symptoms       EXTERNAL PALPATION:  Levator Ani: no Pain/Tightness R, no Pain/Tightness L  Bulbo: no Pain/Tightness R, no Pain/Tightness L  Ischiocavernosus: no Pain/Tightness R, no Pain/Tightness L  Transverse perineum: no Pain/Tightness R, no Pain/Tightness L    OUTCOMES MEASURE:  Female NIH Chronic Prostatitis Symptom index: 11      TREATMENT  Therapeutic activity:  Review of pelvic floor anatomy,  role of PFPT, assessment of pelvic floor   Reviewed urgency control techniques for pt to begin working on at home as well as bladder norms and improved bladder habits; discussed specifically working on these first thing in the am when her urgency is strong   Also discussed diaphragmatic breathing and exhalation with standing from bed to contract pelvic floor and walk to the bathroom to see if this also limits leakage     Review and educated pt on Knack technique to work on when she can anticipate cough, sneeze        Careplan Goals:  Problem: Pelvic Floor       Goal: Reduce frequency or urine loss to: less than 5x/week for improved bladder control           Goal: Pt will be independent in urgency control techniques for decrease sense of urgency/frequency of elimination          Goal: Pt will be independent in  Frank technique for improved bladder control with increased intraabdominal pressure           Goal: Pt will be independent in HEP for home maintenance            Allan Rogers, PT

## 2025-08-06 ENCOUNTER — APPOINTMENT (OUTPATIENT)
Dept: OBSTETRICS AND GYNECOLOGY | Facility: CLINIC | Age: 80
End: 2025-08-06
Payer: MEDICARE

## 2025-08-06 ENCOUNTER — TELEPHONE (OUTPATIENT)
Dept: OBSTETRICS AND GYNECOLOGY | Facility: CLINIC | Age: 80
End: 2025-08-06
Payer: MEDICARE

## 2025-08-06 DIAGNOSIS — C44.99: Primary | ICD-10-CM

## 2025-08-06 DIAGNOSIS — C44.99 EXTRAMAMMARY PAGET DISEASE: Primary | ICD-10-CM

## 2025-08-06 LAB
LAB AP ASR DISCLAIMER: NORMAL
LABORATORY COMMENT REPORT: NORMAL
PATH REPORT.FINAL DX SPEC: NORMAL
PATH REPORT.GROSS SPEC: NORMAL
PATH REPORT.RELEVANT HX SPEC: NORMAL
PATH REPORT.TOTAL CANCER: NORMAL

## 2025-08-06 NOTE — TELEPHONE ENCOUNTER
Received a page from Bisi, and returned the call.   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   Reviewed that we also placed an order for urine cytology and pelvic US to complete her screening.   She is out of town but will return tomorrow to schedule testing.   All questions answered.   Paula Vasquez MD MPH

## 2025-08-06 NOTE — TELEPHONE ENCOUNTER
Called patient to discuss results of pathology.   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   Discussed that management of this is done by gyn oncology and that a referral has been placed. They will call her to schedule the appointment.   Discussed that extramammary paget's disease can be associated with malignancies in other locations including breast, rectal, bladder, cervix.   She has never had an abnormal pap, and stopped screening at age appropriate time in her 60s.   She is up to date on her mammogram and had a negative colonoscopy 2 years ago.     She has not had a recent cystoscopy and a referral to urology was placed for screening.  All questions answered, gyn oncology team messaged to aid with scheduling.    Paula Vasquez MD MPH

## 2025-08-07 ENCOUNTER — TELEPHONE (OUTPATIENT)
Dept: PRIMARY CARE | Facility: CLINIC | Age: 80
End: 2025-08-07
Payer: MEDICARE

## 2025-08-07 DIAGNOSIS — F41.9 ANXIETY: ICD-10-CM

## 2025-08-07 DIAGNOSIS — C44.99 EXTRAMAMMARY PAGET DISEASE: Primary | ICD-10-CM

## 2025-08-07 NOTE — TELEPHONE ENCOUNTER
She saw Dr. Vasquez and got dx with cancer. She would like to speak with you today before 2 if possible.

## 2025-08-08 ENCOUNTER — HOSPITAL ENCOUNTER (OUTPATIENT)
Facility: HOSPITAL | Age: 80
Setting detail: OUTPATIENT SURGERY
End: 2025-08-08
Attending: STUDENT IN AN ORGANIZED HEALTH CARE EDUCATION/TRAINING PROGRAM | Admitting: STUDENT IN AN ORGANIZED HEALTH CARE EDUCATION/TRAINING PROGRAM
Payer: MEDICARE

## 2025-08-08 ENCOUNTER — OFFICE VISIT (OUTPATIENT)
Dept: GYNECOLOGIC ONCOLOGY | Facility: HOSPITAL | Age: 80
End: 2025-08-08
Payer: MEDICARE

## 2025-08-08 ENCOUNTER — TELEPHONE (OUTPATIENT)
Dept: PULMONOLOGY | Facility: HOSPITAL | Age: 80
End: 2025-08-08

## 2025-08-08 ENCOUNTER — PREP FOR PROCEDURE (OUTPATIENT)
Dept: OPERATING ROOM | Facility: HOSPITAL | Age: 80
End: 2025-08-08

## 2025-08-08 ENCOUNTER — APPOINTMENT (OUTPATIENT)
Dept: LAB | Facility: HOSPITAL | Age: 80
End: 2025-08-08
Payer: MEDICARE

## 2025-08-08 ENCOUNTER — HOSPITAL ENCOUNTER (OUTPATIENT)
Dept: RADIOLOGY | Facility: CLINIC | Age: 80
Discharge: HOME | End: 2025-08-08
Payer: MEDICARE

## 2025-08-08 VITALS
SYSTOLIC BLOOD PRESSURE: 173 MMHG | BODY MASS INDEX: 22.3 KG/M2 | RESPIRATION RATE: 16 BRPM | HEART RATE: 64 BPM | WEIGHT: 134 LBS | TEMPERATURE: 97.5 F | DIASTOLIC BLOOD PRESSURE: 82 MMHG | OXYGEN SATURATION: 97 %

## 2025-08-08 DIAGNOSIS — C44.99 EXTRAMAMMARY PAGETS DISEASE: Primary | ICD-10-CM

## 2025-08-08 DIAGNOSIS — C44.99: ICD-10-CM

## 2025-08-08 PROCEDURE — 76856 US EXAM PELVIC COMPLETE: CPT

## 2025-08-08 PROCEDURE — 1160F RVW MEDS BY RX/DR IN RCRD: CPT | Performed by: STUDENT IN AN ORGANIZED HEALTH CARE EDUCATION/TRAINING PROGRAM

## 2025-08-08 PROCEDURE — 99204 OFFICE O/P NEW MOD 45 MIN: CPT | Performed by: STUDENT IN AN ORGANIZED HEALTH CARE EDUCATION/TRAINING PROGRAM

## 2025-08-08 PROCEDURE — 1159F MED LIST DOCD IN RCRD: CPT | Performed by: STUDENT IN AN ORGANIZED HEALTH CARE EDUCATION/TRAINING PROGRAM

## 2025-08-08 PROCEDURE — 88112 CYTOPATH CELL ENHANCE TECH: CPT

## 2025-08-08 PROCEDURE — 99214 OFFICE O/P EST MOD 30 MIN: CPT | Performed by: STUDENT IN AN ORGANIZED HEALTH CARE EDUCATION/TRAINING PROGRAM

## 2025-08-08 RX ORDER — CELECOXIB 200 MG/1
400 CAPSULE ORAL ONCE
OUTPATIENT
Start: 2025-08-08 | End: 2025-08-08

## 2025-08-08 RX ORDER — HEPARIN SODIUM 5000 [USP'U]/ML
5000 INJECTION, SOLUTION INTRAVENOUS; SUBCUTANEOUS ONCE
OUTPATIENT
Start: 2025-08-08 | End: 2025-08-08

## 2025-08-08 RX ORDER — ACETAMINOPHEN 325 MG/1
975 TABLET ORAL ONCE
OUTPATIENT
Start: 2025-08-08 | End: 2025-08-08

## 2025-08-08 NOTE — PROGRESS NOTES
"Patient ID: Bisi Doran is a 80 y.o. female.  Referring Physician: Paula Vasquez MD MPH  [address] [facility]   [facility], [address]  [place] [address]  Primary Care Provider: Pricilla Moran MD      Subjective    HPI  80 y.o. presenting with EMPD of the vulva.     She notes seeing urogynecology for incontinence 2 years ago. At that time she was noted to have a small \"red lesion\". On her most recent visit the lesion has grown and was biopsied showing EMPD (no mention of invasion noted).     She denies itching/pain prior to biopsy. She reports some tenderness since the biopsy.     They deny fever, chills, constipation, diarrhea, vaginal bleeding, abdominal pain, nausea, vomiting, or any other symptoms other than those listed in the interval history.    PMH:  Medical History[1]     PSH:  Surgical History[2]   Tubal ligation    OBHx:  The patient is a . She underwent menopause at 52. She used COCs for several years. She did use HRT.    Social:  They deny alcohol, tobacco, and recreational drug use. The patient lives at home with her .     FamHx:  Father with Paget's disease on his scalp, paternal grandmother with breast cancer in her 90s.  Their history is otherwise negative for a history of breast, ovarian, uterine, colon, pancreatic, and GI cancer.     Screening:  Cervical cancer: 2017, NILM  Mammogram:  BIRADS 1,   Colonoscopy:       Review of Systems - Oncology     Objective   BSA: There is no height or weight on file to calculate BSA.  There were no vitals taken for this visit.     Family History[3]    Bisi Doran  reports that she has never smoked. She has never used smokeless tobacco.  She  has no history on file for alcohol use.  She  has no history on file for drug use.    Physical Exam  Constitutional:       General: She is not in acute distress.     Appearance: Normal appearance. She is normal weight. She is not toxic-appearing.   HENT:      Head: " Normocephalic.      Mouth/Throat:      Mouth: Mucous membranes are moist.      Pharynx: Oropharynx is clear.     Eyes:      Extraocular Movements: Extraocular movements intact.      Conjunctiva/sclera: Conjunctivae normal.      Pupils: Pupils are equal, round, and reactive to light.       Cardiovascular:      Rate and Rhythm: Normal rate and regular rhythm.      Heart sounds: Normal heart sounds. No murmur heard.     No friction rub. No gallop.   Pulmonary:      Effort: Pulmonary effort is normal.      Breath sounds: Normal breath sounds. No wheezing or rhonchi.   Abdominal:      General: Abdomen is flat. Bowel sounds are normal. There is no distension.      Palpations: Abdomen is soft.      Tenderness: There is no abdominal tenderness.   Genitourinary:       Comments: Normal external genitalia without lesions or masses (outside of imaging above  Speculum Exam: Smooth vagina without lesions or masses, normal appearing cervix  without lesions or masses, atrophic changes noted      Musculoskeletal:         General: No swelling. Normal range of motion.      Cervical back: Normal range of motion.     Skin:     General: Skin is warm and dry.     Neurological:      General: No focal deficit present.      Mental Status: She is alert and oriented to person, place, and time.     Psychiatric:         Mood and Affect: Mood normal.         Behavior: Behavior normal.       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     Note: Microscopic examination reveals a specimen that extends into the subcutaneous fat. There are nested and single atypical epithelioid cells with abundant pink cytoplasm in the epithelium. There are foci of mucin  staining seen on a Mucicarmine stain. These cells stain with antibodies against CK7 and do not stain with antibodies against CK20 or SOX-10. All control slides stain appropriately.  The atypical epithelioid cells in the epidermis stain with antibodies against CEA.  All control slides stain appropriately.        ** Electronically signed out by Nikia Kraft MD **           Performance Status:  Asymptomatic    Assessment/Plan      Oncology History    No history exists.     80 y.o. presenting with vulvar EMPD    # EMPD  - We discussed her pathology, no evidence of invasion noted on derm path  - We reviewed Paget's disease can be invasive or non-invasive  - She is up to date on colonoscopy, mammogram. She will need a cystoscopy.  - We discussed the pathophysiology of EMPD, its relationship to cancer   - We discussed treatment options including medical and surgical options as well as my recommendation for a wide local excision as both a diagnostic and therapeutic modality  - Risks of surgery, anticipated hospital stay, and recovery were discussed  - She will need PAT    Linette Daily MD, MS                           [1]   Past Medical History:  Diagnosis Date    Encounter for examination of ears and hearing without abnormal findings     Encounter for hearing test    Personal history of other endocrine, nutritional and metabolic disease 2015    History of hypercholesterolemia    Personal history of other mental and behavioral disorders     History of depression   [2]   Past Surgical History:  Procedure Laterality Date    APPENDECTOMY  1955    Appendectomy    TONSILLECTOMY  1951    Tonsillectomy   [3]   Family History  Problem Relation Name Age of Onset    Bipolar disorder Mother Aliya Donnelly          50 allergic reaction    Depression Mother Aliya Donnelly     Mental illness Mother Aliya Donnelly     Heart failure Father  80         87    No Known Problems Sister

## 2025-08-11 ENCOUNTER — TUMOR BOARD CONFERENCE (OUTPATIENT)
Dept: HEMATOLOGY/ONCOLOGY | Facility: HOSPITAL | Age: 80
End: 2025-08-11
Payer: MEDICARE

## 2025-08-11 LAB
LABORATORY COMMENT REPORT: NORMAL
LABORATORY COMMENT REPORT: NORMAL
PATH REPORT.FINAL DX SPEC: NORMAL
PATH REPORT.GROSS SPEC: NORMAL
PATH REPORT.RELEVANT HX SPEC: NORMAL
PATH REPORT.TOTAL CANCER: NORMAL

## 2025-08-12 ENCOUNTER — OFFICE VISIT (OUTPATIENT)
Dept: UROLOGY | Facility: HOSPITAL | Age: 80
End: 2025-08-12
Payer: MEDICARE

## 2025-08-12 DIAGNOSIS — R39.9 LOWER URINARY TRACT SYMPTOMS (LUTS): Primary | ICD-10-CM

## 2025-08-12 PROCEDURE — 1159F MED LIST DOCD IN RCRD: CPT | Performed by: STUDENT IN AN ORGANIZED HEALTH CARE EDUCATION/TRAINING PROGRAM

## 2025-08-12 PROCEDURE — 99204 OFFICE O/P NEW MOD 45 MIN: CPT | Performed by: STUDENT IN AN ORGANIZED HEALTH CARE EDUCATION/TRAINING PROGRAM

## 2025-08-12 PROCEDURE — G2211 COMPLEX E/M VISIT ADD ON: HCPCS | Performed by: STUDENT IN AN ORGANIZED HEALTH CARE EDUCATION/TRAINING PROGRAM

## 2025-08-12 PROCEDURE — 99212 OFFICE O/P EST SF 10 MIN: CPT

## 2025-08-15 ENCOUNTER — HOSPITAL ENCOUNTER (OUTPATIENT)
Dept: RADIOLOGY | Facility: CLINIC | Age: 80
Discharge: HOME | End: 2025-08-15
Payer: MEDICARE

## 2025-08-15 DIAGNOSIS — R39.9 LOWER URINARY TRACT SYMPTOMS (LUTS): ICD-10-CM

## 2025-08-15 LAB
LAB AP ASR DISCLAIMER: NORMAL
LABORATORY COMMENT REPORT: NORMAL
PATH REPORT.ADDENDUM SPEC: NORMAL
PATH REPORT.FINAL DX SPEC: NORMAL
PATH REPORT.GROSS SPEC: NORMAL
PATH REPORT.RELEVANT HX SPEC: NORMAL
PATH REPORT.TOTAL CANCER: NORMAL

## 2025-08-15 PROCEDURE — 76770 US EXAM ABDO BACK WALL COMP: CPT

## 2025-08-19 ENCOUNTER — PROCEDURE VISIT (OUTPATIENT)
Dept: UROLOGY | Facility: HOSPITAL | Age: 80
End: 2025-08-19
Payer: MEDICARE

## 2025-08-19 VITALS — DIASTOLIC BLOOD PRESSURE: 75 MMHG | HEART RATE: 58 BPM | SYSTOLIC BLOOD PRESSURE: 137 MMHG

## 2025-08-19 DIAGNOSIS — R39.9 LOWER URINARY TRACT SYMPTOMS (LUTS): ICD-10-CM

## 2025-08-19 DIAGNOSIS — Z79.2 PROPHYLACTIC ANTIBIOTIC: Primary | ICD-10-CM

## 2025-08-19 LAB
POC APPEARANCE, URINE: CLEAR
POC BILIRUBIN, URINE: NEGATIVE
POC BLOOD, URINE: ABNORMAL
POC COLOR, URINE: YELLOW
POC GLUCOSE, URINE: NEGATIVE MG/DL
POC KETONES, URINE: NEGATIVE MG/DL
POC LEUKOCYTES, URINE: ABNORMAL
POC NITRITE,URINE: NEGATIVE
POC PH, URINE: 6.5 PH
POC PROTEIN, URINE: NEGATIVE MG/DL
POC SPECIFIC GRAVITY, URINE: 1.02
POC UROBILINOGEN, URINE: 0.2 EU/DL

## 2025-08-19 PROCEDURE — 52000 CYSTOURETHROSCOPY: CPT | Performed by: STUDENT IN AN ORGANIZED HEALTH CARE EDUCATION/TRAINING PROGRAM

## 2025-08-19 PROCEDURE — 81003 URINALYSIS AUTO W/O SCOPE: CPT | Performed by: STUDENT IN AN ORGANIZED HEALTH CARE EDUCATION/TRAINING PROGRAM

## 2025-08-19 PROCEDURE — 99214 OFFICE O/P EST MOD 30 MIN: CPT | Performed by: STUDENT IN AN ORGANIZED HEALTH CARE EDUCATION/TRAINING PROGRAM

## 2025-08-19 RX ORDER — CIPROFLOXACIN 500 MG/1
500 TABLET, FILM COATED ORAL ONCE
Status: SHIPPED | OUTPATIENT
Start: 2025-08-19

## 2025-08-28 DIAGNOSIS — E78.00 PURE HYPERCHOLESTEROLEMIA: ICD-10-CM

## 2025-08-28 RX ORDER — ROSUVASTATIN CALCIUM 10 MG/1
10 TABLET, COATED ORAL NIGHTLY
Qty: 90 TABLET | Refills: 3 | Status: SHIPPED | OUTPATIENT
Start: 2025-08-28

## 2025-08-29 ENCOUNTER — APPOINTMENT (OUTPATIENT)
Dept: OBSTETRICS AND GYNECOLOGY | Facility: CLINIC | Age: 80
End: 2025-08-29
Payer: MEDICARE